# Patient Record
Sex: FEMALE | Race: WHITE | Employment: OTHER | URBAN - METROPOLITAN AREA
[De-identification: names, ages, dates, MRNs, and addresses within clinical notes are randomized per-mention and may not be internally consistent; named-entity substitution may affect disease eponyms.]

---

## 2017-04-05 ENCOUNTER — OFFICE VISIT (OUTPATIENT)
Dept: FAMILY MEDICINE CLINIC | Age: 32
End: 2017-04-05

## 2017-04-05 ENCOUNTER — HOSPITAL ENCOUNTER (OUTPATIENT)
Dept: LAB | Age: 32
Discharge: HOME | End: 2017-04-05
Payer: COMMERCIAL

## 2017-04-05 VITALS
BODY MASS INDEX: 19.84 KG/M2 | TEMPERATURE: 98.4 F | HEART RATE: 87 BPM | HEIGHT: 70 IN | RESPIRATION RATE: 16 BRPM | WEIGHT: 138.6 LBS | DIASTOLIC BLOOD PRESSURE: 82 MMHG | SYSTOLIC BLOOD PRESSURE: 130 MMHG | OXYGEN SATURATION: 99 %

## 2017-04-05 DIAGNOSIS — Z13.29 SCREENING FOR THYROID DISORDER: ICD-10-CM

## 2017-04-05 DIAGNOSIS — Z13.228 SCREENING FOR METABOLIC DISORDER: ICD-10-CM

## 2017-04-05 DIAGNOSIS — Z13.21 ENCOUNTER FOR VITAMIN DEFICIENCY SCREENING: ICD-10-CM

## 2017-04-05 DIAGNOSIS — Z13.220 SCREENING, LIPID: ICD-10-CM

## 2017-04-05 DIAGNOSIS — Z13.1 SCREENING FOR DIABETES MELLITUS: ICD-10-CM

## 2017-04-05 DIAGNOSIS — R41.840 ATTENTION AND CONCENTRATION DEFICIT: ICD-10-CM

## 2017-04-05 DIAGNOSIS — Z13.89 SCREENING FOR BLOOD OR PROTEIN IN URINE: ICD-10-CM

## 2017-04-05 DIAGNOSIS — Z13.0 SCREENING, ANEMIA, DEFICIENCY, IRON: ICD-10-CM

## 2017-04-05 DIAGNOSIS — Z00.00 ROUTINE GENERAL MEDICAL EXAMINATION AT A HEALTH CARE FACILITY: Primary | ICD-10-CM

## 2017-04-05 LAB
25(OH)D3 SERPL-MCNC: 30.7 NG/ML (ref 30–100)
ALBUMIN SERPL BCP-MCNC: 4.2 G/DL (ref 3.4–5)
ALBUMIN/GLOB SERPL: 1.4 {RATIO} (ref 0.8–1.7)
ALP SERPL-CCNC: 36 U/L (ref 45–117)
ALT SERPL-CCNC: 19 U/L (ref 13–56)
ANION GAP BLD CALC-SCNC: 9 MMOL/L (ref 3–18)
APPEARANCE UR: CLEAR
AST SERPL W P-5'-P-CCNC: 22 U/L (ref 15–37)
BASOPHILS # BLD AUTO: 0 K/UL (ref 0–0.06)
BASOPHILS # BLD: 0 % (ref 0–2)
BILIRUB SERPL-MCNC: 0.5 MG/DL (ref 0.2–1)
BILIRUB UR QL: NEGATIVE
BUN SERPL-MCNC: 10 MG/DL (ref 7–18)
BUN/CREAT SERPL: 13 (ref 12–20)
CALCIUM SERPL-MCNC: 8.7 MG/DL (ref 8.5–10.1)
CHLORIDE SERPL-SCNC: 107 MMOL/L (ref 100–108)
CHOLEST SERPL-MCNC: 191 MG/DL
CO2 SERPL-SCNC: 26 MMOL/L (ref 21–32)
COLOR UR: YELLOW
CREAT SERPL-MCNC: 0.8 MG/DL (ref 0.6–1.3)
DIFFERENTIAL METHOD BLD: NORMAL
EOSINOPHIL # BLD: 0.1 K/UL (ref 0–0.4)
EOSINOPHIL NFR BLD: 1 % (ref 0–5)
ERYTHROCYTE [DISTWIDTH] IN BLOOD BY AUTOMATED COUNT: 13.6 % (ref 11.6–14.5)
EST. AVERAGE GLUCOSE BLD GHB EST-MCNC: 108 MG/DL
GLOBULIN SER CALC-MCNC: 3.1 G/DL (ref 2–4)
GLUCOSE SERPL-MCNC: 76 MG/DL (ref 74–99)
GLUCOSE UR STRIP.AUTO-MCNC: NEGATIVE MG/DL
HBA1C MFR BLD: 5.4 % (ref 4.2–5.6)
HCT VFR BLD AUTO: 41.7 % (ref 35–45)
HDLC SERPL-MCNC: 80 MG/DL (ref 40–60)
HDLC SERPL: 2.4 {RATIO} (ref 0–5)
HGB BLD-MCNC: 13.6 G/DL (ref 12–16)
HGB UR QL STRIP: NEGATIVE
KETONES UR QL STRIP.AUTO: ABNORMAL MG/DL
LDLC SERPL CALC-MCNC: 101.2 MG/DL (ref 0–100)
LEUKOCYTE ESTERASE UR QL STRIP.AUTO: NEGATIVE
LIPID PROFILE,FLP: ABNORMAL
LYMPHOCYTES # BLD AUTO: 28 % (ref 21–52)
LYMPHOCYTES # BLD: 1.7 K/UL (ref 0.9–3.6)
MCH RBC QN AUTO: 29.8 PG (ref 24–34)
MCHC RBC AUTO-ENTMCNC: 32.6 G/DL (ref 31–37)
MCV RBC AUTO: 91.4 FL (ref 74–97)
MONOCYTES # BLD: 0.3 K/UL (ref 0.05–1.2)
MONOCYTES NFR BLD AUTO: 4 % (ref 3–10)
NEUTS SEG # BLD: 3.9 K/UL (ref 1.8–8)
NEUTS SEG NFR BLD AUTO: 67 % (ref 40–73)
NITRITE UR QL STRIP.AUTO: NEGATIVE
PH UR STRIP: 5.5 [PH] (ref 5–8)
PLATELET # BLD AUTO: 220 K/UL (ref 135–420)
PMV BLD AUTO: 10.7 FL (ref 9.2–11.8)
POTASSIUM SERPL-SCNC: 4 MMOL/L (ref 3.5–5.5)
PROT SERPL-MCNC: 7.3 G/DL (ref 6.4–8.2)
PROT UR STRIP-MCNC: NEGATIVE MG/DL
RBC # BLD AUTO: 4.56 M/UL (ref 4.2–5.3)
SODIUM SERPL-SCNC: 142 MMOL/L (ref 136–145)
SP GR UR REFRACTOMETRY: 1.01 (ref 1–1.03)
TRIGL SERPL-MCNC: 49 MG/DL (ref ?–150)
TSH SERPL DL<=0.05 MIU/L-ACNC: 0.61 UIU/ML (ref 0.36–3.74)
UROBILINOGEN UR QL STRIP.AUTO: 0.2 EU/DL (ref 0.2–1)
VLDLC SERPL CALC-MCNC: 9.8 MG/DL
WBC # BLD AUTO: 5.9 K/UL (ref 4.6–13.2)

## 2017-04-05 PROCEDURE — 80053 COMPREHEN METABOLIC PANEL: CPT | Performed by: FAMILY MEDICINE

## 2017-04-05 PROCEDURE — 82306 VITAMIN D 25 HYDROXY: CPT | Performed by: FAMILY MEDICINE

## 2017-04-05 PROCEDURE — 83036 HEMOGLOBIN GLYCOSYLATED A1C: CPT | Performed by: FAMILY MEDICINE

## 2017-04-05 PROCEDURE — 80061 LIPID PANEL: CPT | Performed by: FAMILY MEDICINE

## 2017-04-05 PROCEDURE — 36415 COLL VENOUS BLD VENIPUNCTURE: CPT | Performed by: FAMILY MEDICINE

## 2017-04-05 PROCEDURE — 85025 COMPLETE CBC W/AUTO DIFF WBC: CPT | Performed by: FAMILY MEDICINE

## 2017-04-05 PROCEDURE — 84443 ASSAY THYROID STIM HORMONE: CPT | Performed by: FAMILY MEDICINE

## 2017-04-05 PROCEDURE — 81003 URINALYSIS AUTO W/O SCOPE: CPT | Performed by: FAMILY MEDICINE

## 2017-04-05 NOTE — PROGRESS NOTES
HISTORY OF PRESENT ILLNESS  Carlos Celeste is a 28 y.o. female. Establish Care   The history is provided by the patient. Pertinent negatives include no chest pain, no abdominal pain, no headaches and no shortness of breath. History reviewed. No pertinent past medical history. History reviewed. No pertinent surgical history. Family History   Problem Relation Age of Onset    Diabetes Brother      type 1    Cancer Neg Hx     Heart Disease Neg Hx     Hypertension Neg Hx      History   Smoking Status    Never Smoker   Smokeless Tobacco    Not on file     History   Alcohol Use    Yes     Comment: very rare      Health Maintenance Review:  Tetanus immunization - Tdap 2016  Influenza immunization - N/A  Pap smear - 2/2016, norrmal  Mammogram - N/A    Establish Care/Wellness Encounter Plan:  Anticipatory guidance and recommendations provided verbally and with printed information. Return for routine follow up in 1 year, sooner with any problems. 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 Indications: PAIN, Disp: 60 Tab, Rfl: 1      Review of Systems   Constitutional: Negative for chills, fever and weight loss. HENT: Negative for hearing loss. Eyes: Negative for blurred vision and double vision. Wears corrective lenses   Respiratory: Negative for cough, shortness of breath and wheezing. Cardiovascular: Negative for chest pain, palpitations and leg swelling. Gastrointestinal: Negative for abdominal pain, constipation, diarrhea, heartburn, nausea and vomiting. Genitourinary: Negative for dysuria and urgency. Musculoskeletal: Negative for joint pain and myalgias. Skin: Negative for itching and rash.    Neurological: Negative for dizziness, tingling, sensory change, focal weakness and headaches. Endo/Heme/Allergies: Negative for environmental allergies. Psychiatric/Behavioral: Negative for depression. The patient is not nervous/anxious and does not have insomnia. 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 She appears well-developed and well-nourished. HENT:   Head: Normocephalic. Right Ear: Tympanic membrane and ear canal normal.   Left Ear: Tympanic membrane and ear canal normal.   Mouth/Throat: Oropharynx is clear and moist.   Eyes: Conjunctivae and EOM are normal. Pupils are equal, round, and reactive to light. Neck: Neck supple. Cardiovascular: Normal rate, regular rhythm, normal heart sounds and intact distal pulses. Pulmonary/Chest: Effort normal and breath sounds normal.   Abdominal: Soft. Bowel sounds are normal. She exhibits no mass. There is no tenderness. Musculoskeletal: She exhibits no edema. Neurological: She is alert and oriented to person, place, and time. She has normal reflexes. Rhomberg negative, no focal neurological deficits noted     Skin: Skin is warm and dry. Psychiatric: She has a normal mood and affect. Her behavior is normal.   Nursing note and vitals reviewed. ASSESSMENT and PLAN    ICD-10-CM ICD-9-CM    1. Routine general medical examination at a health care facility Z00.00 V70.0    2. Attention and concentration deficit R41.840 799.51    3. Screening, anemia, deficiency, iron Z13.0 V78.0 CBC WITH AUTOMATED DIFF   4. Screening for diabetes mellitus Z13.1 V77.1 HEMOGLOBIN A1C WITH EAG   5.  Screening, lipid Z13.220 V77.91 LIPID PANEL   6. Screening for metabolic disorder J56.305 Z98.27 METABOLIC PANEL, COMPREHENSIVE   7. Screening for blood or protein in urine Z13.89 V82.9 URINALYSIS W/ RFLX MICROSCOPIC   8. Screening for thyroid disorder Z13.29 V77.0 TSH 3RD GENERATION   9. Encounter for vitamin deficiency screening Z13.21 V77.99 VITAMIN D, 25 HYDROXY   Further disposition pending lab results if indicated.   Referral for ADD testing-Patient will check with insurance carrier for on plan providers and call back (attempts to contact potential providers during the appointment yielded no response)

## 2017-04-05 NOTE — PATIENT INSTRUCTIONS
Further disposition pending lab results if indicated. Referral for ADD testing  Anticipatory guidance and recommendations provided verbally and with printed information. Return for routine follow up in 1 year, sooner with any problems. Well Visit, Ages 25 to 48: Care Instructions  Your Care Instructions  Physical exams can help you stay healthy. Your doctor has checked your overall health and may have suggested ways to take good care of yourself. He or she also may have recommended tests. At home, you can help prevent illness with healthy eating, regular exercise, and other steps. Follow-up care is a key part of your treatment and safety. Be sure to make and go to all appointments, and call your doctor if you are having problems. It's also a good idea to know your test results and keep a list of the medicines you take. How can you care for yourself at home? · Reach and stay at a healthy weight. This will lower your risk for many problems, such as obesity, diabetes, heart disease, and high blood pressure. · Get at least 30 minutes of physical activity on most days of the week. Walking is a good choice. You also may want to do other activities, such as running, swimming, cycling, or playing tennis or team sports. Discuss any changes in your exercise program with your doctor. · Do not smoke or allow others to smoke around you. If you need help quitting, talk to your doctor about stop-smoking programs and medicines. These can increase your chances of quitting for good. · Talk to your doctor about whether you have any risk factors for sexually transmitted infections (STIs). Having one sex partner (who does not have STIs and does not have sex with anyone else) is a good way to avoid these infections. · Use birth control if you do not want to have children at this time. Talk with your doctor about the choices available and what might be best for you. · Protect your skin from too much sun.  When you're outdoors from 10 a.m. to 4 p.m., stay in the shade or cover up with clothing and a hat with a wide brim. Wear sunglasses that block UV rays. Even when it's cloudy, put broad-spectrum sunscreen (SPF 30 or higher) on any exposed skin. · See a dentist one or two times a year for checkups and to have your teeth cleaned. · Wear a seat belt in the car. · Drink alcohol in moderation, if at all. That means no more than 2 drinks a day for men and 1 drink a day for women. Follow your doctor's advice about when to have certain tests. These tests can spot problems early. For everyone  · Cholesterol. Have the fat (cholesterol) in your blood tested after age 21. Your doctor will tell you how often to have this done based on your age, family history, or other things that can increase your risk for heart disease. · Blood pressure. Have your blood pressure checked during a routine doctor visit. Your doctor will tell you how often to check your blood pressure based on your age, your blood pressure results, and other factors. · Vision. Talk with your doctor about how often to have a glaucoma test.  · Diabetes. Ask your doctor whether you should have tests for diabetes. · Colon cancer. Have a test for colon cancer at age 48. You may have one of several tests. If you are younger than 48, you may need a test earlier if you have any risk factors. Risk factors include whether you already had a precancerous polyp removed from your colon or whether your parent, brother, sister, or child has had colon cancer. For women  · Breast exam and mammogram. Talk to your doctor about when you should have a clinical breast exam and a mammogram. Medical experts differ on whether and how often women under 50 should have these tests. Your doctor can help you decide what is right for you. · Pap test and pelvic exam. Begin Pap tests at age 24. A Pap test is the best way to find cervical cancer.  The test often is part of a pelvic exam. Ask how often to have this test.  · Tests for sexually transmitted infections (STIs). Ask whether you should have tests for STIs. You may be at risk if you have sex with more than one person, especially if your partners do not wear condoms. For men  · Tests for sexually transmitted infections (STIs). Ask whether you should have tests for STIs. You may be at risk if you have sex with more than one person, especially if you do not wear a condom. · Testicular cancer exam. Ask your doctor whether you should check your testicles regularly. · Prostate exam. Talk to your doctor about whether you should have a blood test (called a PSA test) for prostate cancer. Experts differ on whether and when men should have this test. Some experts suggest it if you are older than 39 and are -American or have a father or brother who got prostate cancer when he was younger than 72. When should you call for help? Watch closely for changes in your health, and be sure to contact your doctor if you have any problems or symptoms that concern you. Where can you learn more? Go to http://hyacinth-elena.info/. Enter P072 in the search box to learn more about \"Well Visit, Ages 25 to 48: Care Instructions. \"  Current as of: July 19, 2016  Content Version: 11.2  © 9540-9728 [employer]. Care instructions adapted under license by [employer] (which disclaims liability or warranty for this information). If you have questions about a medical condition or this instruction, always ask your healthcare professional. [place] [address] any warranty or liability for your use of this information.

## 2017-04-05 NOTE — PROGRESS NOTES
Kenisha Sommer is a 28 y.o. female here to establish care        1. Have you been to the ER, urgent care clinic or hospitalized since your last visit? NO.     2. Have you seen or consulted any other health care providers outside of the [address] [place] [address] since your last visit (Include any pap smears or colon screening)? NO      Do you have an Advanced Directive? NO    Would you like information on Advanced Directives?  NO      Learning Assessment 4/5/2017   PRIMARY LEARNER Patient   HIGHEST LEVEL OF EDUCATION - PRIMARY LEARNER  4 YEARS OF COLLEGE   BARRIERS PRIMARY LEARNER NONE   CO-LEARNER CAREGIVER No   PRIMARY LANGUAGE ENGLISH   LEARNER PREFERENCE PRIMARY DEMONSTRATION   ANSWERED BY patient   RELATIONSHIP SELF

## 2017-04-05 NOTE — MR AVS SNAPSHOT
Visit Information Date & Time Provider Department Dept. Phone Encounter #  
 4/5/2017  1:30 PM Harley Quintero, [facility] [phone] [id number] Upcoming Health Maintenance Date Due INFLUENZA AGE 9 TO ADULT 8/1/2016 PAP AKA CERVICAL CYTOLOGY 2/17/2017 DTaP/Tdap/Td series (2 - Td) 11/3/2025 Allergies as of 4/5/2017  Review Complete On: 4/5/2017 By: Harley Quintero MD  
 No Known Allergies Current Immunizations  Reviewed on 1/5/2016 Name Date Influenza Vaccine 10/13/2015 MMR 1/5/2016  8:00 AM  
 Tdap 11/3/2015 Not reviewed this visit You Were Diagnosed With   
  
 Codes Comments Routine general medical examination at a health care facility    -  Primary ICD-10-CM: Z00.00 ICD-9-CM: V70.0 Attention and concentration deficit     ICD-10-CM: R41.840 ICD-9-CM: 799.51 Screening, anemia, deficiency, iron     ICD-10-CM: Z13.0 ICD-9-CM: V78.0 Screening for diabetes mellitus     ICD-10-CM: Z13.1 ICD-9-CM: V77.1 Screening, lipid     ICD-10-CM: Z22.276 ICD-9-CM: V77.91 Screening for metabolic disorder     DKC-73-DM: Z13.228 ICD-9-CM: V77.99 Screening for blood or protein in urine     ICD-10-CM: Z13.89 ICD-9-CM: V82.9 Screening for thyroid disorder     ICD-10-CM: Z13.29 ICD-9-CM: V77.0 Encounter for vitamin deficiency screening     ICD-10-CM: Z13.21 ICD-9-CM: V77.99 Vitals BP Pulse Temp Resp Height(growth percentile) Weight(growth percentile) 130/82 (BP 1 Location: Left arm, BP Patient Position: Sitting) 87 98.4 °F (36.9 °C) (Oral) 16 5' 10\" (1.778 m) 138 lb 9.6 oz (62.9 kg) SpO2 BMI OB Status Smoking Status 99% 19.89 kg/m2 Having regular periods Never Smoker BMI and BSA Data Body Mass Index Body Surface Area  
 19.89 kg/m 2 1.76 m 2 Your Updated Medication List  
  
   
This list is accurate as of: 4/5/17  2:05 PM.  Always use your most recent med list.  
  
  
  
 ibuprofen 800 mg tablet Commonly known as:  MOTRIN Take 1 Tab by mouth every eight (8) hours as needed. Indications: PAIN To-Do List   
 04/05/2017 Lab:  CBC WITH AUTOMATED DIFF   
  
 04/05/2017 Lab:  HEMOGLOBIN A1C WITH EAG   
  
 04/05/2017 Lab:  LIPID PANEL   
  
 04/05/2017 Lab:  METABOLIC PANEL, COMPREHENSIVE   
  
 04/05/2017 Lab:  TSH 3RD GENERATION   
  
 04/05/2017 Lab:  URINALYSIS W/ RFLX MICROSCOPIC   
  
 04/05/2017 Lab:  VITAMIN D, 25 HYDROXY Patient Instructions Further disposition pending lab results if indicated. Referral for ADD testing Anticipatory guidance and recommendations provided verbally and with printed information. Return for routine follow up in 1 year, sooner with any problems. Well Visit, Ages 25 to 48: Care Instructions Your Care Instructions Physical exams can help you stay healthy. Your doctor has checked your overall health and may have suggested ways to take good care of yourself. He or she also may have recommended tests. At home, you can help prevent illness with healthy eating, regular exercise, and other steps. Follow-up care is a key part of your treatment and safety. Be sure to make and go to all appointments, and call your doctor if you are having problems. It's also a good idea to know your test results and keep a list of the medicines you take. How can you care for yourself at home? · Reach and stay at a healthy weight. This will lower your risk for many problems, such as obesity, diabetes, heart disease, and high blood pressure. · Get at least 30 minutes of physical activity on most days of the week. Walking is a good choice. You also may want to do other activities, such as running, swimming, cycling, or playing tennis or team sports. Discuss any changes in your exercise program with your doctor. · Do not smoke or allow others to smoke around you.  If you need help quitting, talk to your doctor about stop-smoking programs and medicines. These can increase your chances of quitting for good. · Talk to your doctor about whether you have any risk factors for sexually transmitted infections (STIs). Having one sex partner (who does not have STIs and does not have sex with anyone else) is a good way to avoid these infections. · Use birth control if you do not want to have children at this time. Talk with your doctor about the choices available and what might be best for you. · Protect your skin from too much sun. When you're outdoors from 10 a.m. to 4 p.m., stay in the shade or cover up with clothing and a hat with a wide brim. Wear sunglasses that block UV rays. Even when it's cloudy, put broad-spectrum sunscreen (SPF 30 or higher) on any exposed skin. · See a dentist one or two times a year for checkups and to have your teeth cleaned. · Wear a seat belt in the car. · Drink alcohol in moderation, if at all. That means no more than 2 drinks a day for men and 1 drink a day for women. Follow your doctor's advice about when to have certain tests. These tests can spot problems early. For everyone · Cholesterol. Have the fat (cholesterol) in your blood tested after age 21. Your doctor will tell you how often to have this done based on your age, family history, or other things that can increase your risk for heart disease. · Blood pressure. Have your blood pressure checked during a routine doctor visit. Your doctor will tell you how often to check your blood pressure based on your age, your blood pressure results, and other factors. · Vision. Talk with your doctor about how often to have a glaucoma test. 
· Diabetes. Ask your doctor whether you should have tests for diabetes. · Colon cancer. Have a test for colon cancer at age 48. You may have one of several tests.  If you are younger than 48, you may need a test earlier if you have any risk factors. Risk factors include whether you already had a precancerous polyp removed from your colon or whether your parent, brother, sister, or child has had colon cancer. For women · Breast exam and mammogram. Talk to your doctor about when you should have a clinical breast exam and a mammogram. Medical experts differ on whether and how often women under 50 should have these tests. Your doctor can help you decide what is right for you. · Pap test and pelvic exam. Begin Pap tests at age 24. A Pap test is the best way to find cervical cancer. The test often is part of a pelvic exam. Ask how often to have this test. 
· Tests for sexually transmitted infections (STIs). Ask whether you should have tests for STIs. You may be at risk if you have sex with more than one person, especially if your partners do not wear condoms. For men · Tests for sexually transmitted infections (STIs). Ask whether you should have tests for STIs. You may be at risk if you have sex with more than one person, especially if you do not wear a condom. · Testicular cancer exam. Ask your doctor whether you should check your testicles regularly. · Prostate exam. Talk to your doctor about whether you should have a blood test (called a PSA test) for prostate cancer. Experts differ on whether and when men should have this test. Some experts suggest it if you are older than 39 and are -American or have a father or brother who got prostate cancer when he was younger than 72. When should you call for help? Watch closely for changes in your health, and be sure to contact your doctor if you have any problems or symptoms that concern you. Where can you learn more? Go to http://hyacinth-elena.info/. Enter P072 in the search box to learn more about \"Well Visit, Ages 25 to 48: Care Instructions. \" Current as of: July 19, 2016 Content Version: 11.2 © 0531-7935 [employer]. Care instructions adapted under license by [employer] (which disclaims liability or warranty for this information). If you have questions about a medical condition or this instruction, always ask your healthcare professional. [address] any warranty or liability for your use of this information. Introducing [facility]! [facility] introduces obopay patient portal. Now you can access parts of your medical record, email your doctor's office, and request medication refills online. 1. In your internet browser, go to [email or web address] ([place]). [email or web address] ([place]) 2. Click on the First Time User? Click Here link in the Sign In box. You will see the New Member Sign Up page. 3. Enter your obopay Access Code exactly as it appears below. You will not need to use this code after youve completed the sign-up process. If you do not sign up before the expiration date, you must request a new code. · obopay Access Code: [email or web address] Expires: 7/4/2017  1:17 PM 
 
4. Enter the last four digits of your Social Security Number (xxxx) and Date of Birth (mm/dd/yyyy) as indicated and click Submit. You will be taken to the next sign-up page. 5. Create a obopay ID. This will be your obopay login ID and cannot be changed, so think of one that is secure and easy to remember. 6. Create a obopay password. You can change your password at any time. 7. Enter your Password Reset Question and Answer. This can be used at a later time if you forget your password. 8. Enter your e-mail address. You will receive e-mail notification when new information is available in [address] 9. Click Sign Up. You can now view and download portions of your medical record. 10. Click the Download Summary menu link to download a portable copy of your medical information.  
 
If you have questions, please visit the Frequently Asked Questions section of the [employer]. Remember, gdgthart is NOT to be used for urgent needs. For medical emergencies, dial 911. Now available from your iPhone and Android! Please provide this summary of care documentation to your next provider. Your primary care clinician is listed as Jennifer Lal. If you have any questions after today's visit, please call [phone].

## 2017-11-27 ENCOUNTER — HOSPITAL ENCOUNTER (OUTPATIENT)
Dept: PHYSICAL THERAPY | Age: 32
Discharge: HOME | End: 2017-11-27
Payer: COMMERCIAL

## 2017-11-27 PROCEDURE — 97161 PT EVAL LOW COMPLEX 20 MIN: CPT

## 2017-11-27 PROCEDURE — 97110 THERAPEUTIC EXERCISES: CPT

## 2017-11-27 NOTE — PROGRESS NOTES
[address] [facility] [facility] AT [address], [address] [place] [address] Road  Phone: [phone]   [phone]  PLAN OF CARE / [address] [place] [address] PHYSICAL THERAPY SERVICES  Patient Name: Cally Osuna : 1985   Medical   Diagnosis: L knee Pain Treatment Diagnosis: L Knee Pain   Onset Date: 17     Referral Source: Nickolas Wilson MD Start of Care [facility] [place]): 2017   Prior Hospitalization: See medical history Provider #: [id number]   Prior Level of Function: I with ADL's   Comorbidities: None Listed   Medications: Verified on Patient Summary List   The Plan of Care and following information is based on the information from the initial evaluation.   ===========================================================================================  Assessment / key information:  Pt is 28 y.o. female presenting with pain in the L knee. Pain ranges 4-7/10; better with stretching, worse with sitting and not moving. Pt has pain in the L knee as well as the R hip starting  after a 10 mile run. Patient reports increased pain since beginning of November, and located at the anterior portion of the L knee cap. Pt is very active and denies any pain with jumping or squatting. Pt reports main functional limitation at this time is running. Pt reports L hip pain in the anterior portion of the hip flexor that has been present for several months. Pt amb with normalized gait pattern BL. Mild pronation of the BL feet and mild tibial valgus noted BL. Leg length and pelvic alignment symmetrical. AROM of the BL hips and knees WNL in all directions. Decrease in BL hip and knee strength 4-/5 in all directions. Moderate tightness noted in the BL HS, quads, hip flexors, and ITB. TTP over the superior aspect of the L patella, with mild TTP over the medial and lateral joint lines. Moderate crepitus with squats and pt needs work on squat biomechanics/posturing.  Sensation intact to light touch in the BL LE's. The patient was instructed in a home exercise program to address the above findings/deficits. Pt will benefit from PT interventions to address the aforementioned deficits and allow pt to return to PLOF.    ===========================================================================================  History LOW Complexity : Zero comorbidities / personal factors that will impact the outcome / POC; Examination HIGH Complexity : 4+ Standardized tests and measures addressing body structure, function, activity limitation and / or participation in recreation ; Presentation MEDIUM Complexity : Evolving with changing characteristics ; Decision Making LOW Complexity : FOTO score of ; Complexity LOW   Problem List: pain affecting function, decrease strength, impaired gait/ balance, decrease ADL/ functional abilitiies, decrease activity tolerance, decrease flexibility/ joint mobility and decrease transfer abilities   Treatment Plan may include any combination of the following: Therapeutic exercise, Therapeutic activities, Neuromuscular re-education, Physical agent/modality, Gait/balance training, Manual therapy, Patient education, Functional mobility training and Stair training  Patient / Family readiness to learn indicated by: asking questions, trying to perform skills and interest  Persons(s) to be included in education: patient (P)  Barriers to Learning/Limitations: None  Measures taken:    Patient Goal (s): Strengthen knee/hip, no pain with running   Patient self reported health status: excellent  Rehabilitation Potential: good   Short Term Goals: To be accomplished in  4  treatments:  1. Independent/compliant with long term HEP for flexibility/mobility  2. Evaluate patient for video running analysis with appropriate recommendations PRN  3. Pt will demonstrate BL Jean test [facility] to demonstrate improved rectus femoris mobility at push-off     Long Term Goals:  To be accomplished in  8  treatments:  1. Improve FOTO outcome score by 10% to indicate a significant improvement in ADL function- will assess at DC  2. Pt will report 75% improvement with running and other higher impact recreational activities  3. Pt will increase gross strength > or = to 4+/5 in order to improve ability to perform functional ADL's.  4. Pt will demonstrate independence with long term HEP in order to prepare for DC. Frequency / Duration:   Patient to be seen  2  times per week for 8  treatments:  Patient / Caregiver education and instruction: exercises  G-Codes (GP): ORALIA  Therapist Signature: Maryjo Paiz PT Date: 10/49/6139   Certification Period: NA Time: 11:06 AM   ===========================================================================================  I certify that the above Physical Therapy Services are being furnished while the patient is under my care. I agree with the treatment plan and certify that this therapy is necessary. Physician Signature:        Date:       Time:     Please sign and return to [facility] or you may fax the signed copy to [phone]. Thank you.

## 2017-11-27 NOTE — PROGRESS NOTES
PT KNEE EVAL AND TREATMENT    Patient Name: Abiel Espinoza  Date:2017  : 1985  [x]  Patient  Verified  Payor: BLUE CROSS / Plan: [address] [place] [address] / Product Type: PPO /    In time:1100  Out time:1150  Total Treatment Time (min): 50  Visit #: 1 of 12    Treatment Area: L knee Pain  SUBJECTIVE  Pain Level (0-10 scale): (C):  (B):  (W):    Any medication changes, allergies to medications, diagnosis change, or new procedure performed: see summary sheet for update  Subjective functional status/changes:  CHIEF COMPLAINT: Pt is 28 y.o. female presenting with pain in the L knee. Pain ranges 4-7/10; better with stretching, worse with sitting and not moving. Pt has pain in the L knee as well as the R hip starting  after a 10 mile run. Patient reports increased pain since beginning of November, and located at the anterior portion of the L knee cap. Pt is very active and denies any pain with jumping or squatting. Pt reports main functional limitation at this time is running. Pt reports L hip pain in the anterior portion of the hip flexor that has been present for several months.      OBJECTIVE  Posture: [] Varus    [] Valgus    [] Recurvatum        [] Tibial Torsion    [] Foot Supination    [] Foot Pronation    Describe:    Gait:  [x] Normal    [] Abnormal    [] Antalgic    [] NWB    Device:    Describe: mild pronation of the BL arches, mild tibial valgus    ROM / Strength  [] Unable to assess                  AROM   (WNL)            PROM          Strength (1-5)    Left Right Left Right Left Right   Hip Flexion     4- 4-    Extension     4- 4-    Abduction     4- 4-    Adduction     4- 4-   Knee Flexion     4- 4-    Extension     4- 4-   Ankle Plantarflexion          Dorsiflexion           Flexibility: [] Unable to assess at this time  Hamstrings:    (L) Tightness= [] WNL   [] Min   [x] Mod   [] Severe    (R) Tightness= [] WNL   [] Min   [x] Mod   [] Severe  Quadriceps:    (L) Tightness= [] WNL   [] Min   [x] Mod   [] Severe    (R) Tightness= [] WNL   [] Min   [x] Mod   [] Severe  Gastroc:      (L) Tightness= [] WNL   [] Min   [x] Mod   [] Severe    (R) Tightness= [] WNL   [] Min   [x] Mod   [] Severe    Palpation:TTP superior knee cap, medial and lateral L joint line, BL hip flexors    Optional Tests:  Patellar Positioning (Static)   []L []R Normal []L []R Lateral   []L []R [place]      []L []R Medial   []L []R [place]    Patellar Tracking   [x]L []R Glide (Lat)   []L []R Tilt (Lat)     []L []R Glide (Med)  []L []R Tilt (Med)      []L []R Tile (Inf)     Patellar Mobility   []L []R Hypermobile [x]L [x]R Hypomobile         Other tests/comments: no ligament laxity noted    Modality (rationale): NA  []  E-Stim: type _ x _ min     []att   []unatt   []w/US   []w/ice   []w/heat  []  Traction: []cerv   []pelvic   _ lbs x _ min     []pro   []sup   []int   []const  []  Ultrasound: []cont   []pulse    _ W/cm2 x _  min   []1MHz   []3MHz  []  Iontophoresis: []take home patch w/ dexamethazone    []40mA   []80mA                               []_ mA min w/: []dexamethazone   []other:_  []  Ice pack _  min     [] Hot pack _  min     [] Paraffin _  min  []  Other:     Patient Education: [x] Established HEP    [] POT (minutes) :15    Pain Level (0-10 scale) post treatment: 4  ASSESSMENT  [x]  See Plan of Care    Evaluation Code Complexity: History LOW Complexity : Zero comorbidities / personal factors that will impact the outcome / POC; Examination HIGH Complexity : 4+ Standardized tests and measures addressing body structure, function, activity limitation and / or participation in recreation ; Presentation MEDIUM Complexity : Evolving with changing characteristics ;  Decision Making LOW Complexity : FOTO score of ; Complexity LOW     Justification for Eval Code Complexity:  Patient History : None  Examination SEE ABOVE EXAM  Clinical Presentation: evolving pain in the L knee  Clinical Decision Making : FOTO 75      PLAN  [x]  Upgrade activities as tolerated     []  Continue plan of care  []  Discharge due to:_  [x] Other:_ POC  2x/week for 4 weeks    Meche Paiz, PT 11/27/2017

## 2017-11-28 ENCOUNTER — TELEPHONE (OUTPATIENT)
Dept: FAMILY MEDICINE CLINIC | Age: 32
End: 2017-11-28

## 2017-11-28 NOTE — TELEPHONE ENCOUNTER
Pt's plan of care from [facility] for 11/27/17  was signed by Dr. Joy Faith and faxed back to [phone].  Confirmation received

## 2017-11-29 ENCOUNTER — HOSPITAL ENCOUNTER (OUTPATIENT)
Dept: PHYSICAL THERAPY | Age: 32
Discharge: HOME | End: 2017-11-29
Payer: COMMERCIAL

## 2017-11-29 PROCEDURE — 97110 THERAPEUTIC EXERCISES: CPT

## 2017-11-29 NOTE — PROGRESS NOTES
PT DAILY TREATMENT NOTE 8    Patient Name: John Reeves  Date:2017  : 1985  [x]  Patient  Verified  Payor: AARON [place] / Plan: [address] [place] [address] / Product Type: PPO /    In time:405  Out time:518  Total Treatment Time (min): 68   Visit #: 2 of 12    Treatment Area: Left knee pain [M25.562]    SUBJECTIVE  Pain Level (0-10 scale): 0  Any medication changes, allergies to medications, adverse drug reactions, diagnosis change, or new procedure performed?: [x] No    [] Yes (see summary sheet for update)  Subjective functional status/changes:   [] No changes reported  \"I have been doing the exercises and I think they are making me feel looser\"    OBJECTIVE  Modality rationale: NI   Min Type Additional Details    [] Estim: []Att   []Unatt        []TENS instruct                  []IFC  []Premod   []NMES                     []Other:  []w/US   []w/ice   []w/heat  Position:  Location:    []  Traction: [] Cervical       []Lumbar                       [] Prone          []Supine                       []Intermittent   []Continuous Lbs:  [] before manual  [] after manual    []  Ultrasound: []Continuous   [] Pulsed                           []1MHz   []3MHz Location:  W/cm2:    []  Iontophoresis with dexamethasone         Location: [] Take home patch   [] In clinic    []  Ice     []  heat  []  Ice massage Position:  Location:    []  Vasopneumatic Device Pressure:       [] lo [] med [] hi   Temperature: [] lo [] med [] hi   [x] Skin assessment post-treatment:  [x]intact []redness- no adverse reaction       []redness  adverse reaction:       73 min Therapeutic Exercise:  [x] See flow sheet :   Rationale: increase ROM and increase strength to improve the patients ability to perform functional ADL's    X min Patient Education: [x] Review HEP    [] Progressed/Changed HEP based on:   [] positioning   [] body mechanics   [] transfers   [] heat/ice application        Other Objective/Functional Measures: Initiated therex today per flow sheet, requiring vc and demo 100% of the time for proper form and technique. Pain Level (0-10 scale) post treatment: 0    ASSESSMENT/Changes in Function: Pt continues to need work on BL hip and knee strength, as well as continued work on [address] flexibility with further therapy. Pt will be running a 1/2 marathon this weekend and will resume therapy upon returning next week. Patient will continue to benefit from skilled PT services to modify and progress therapeutic interventions, address functional mobility deficits, address ROM deficits, address strength deficits, analyze and address soft tissue restrictions, analyze and cue movement patterns, analyze and modify body mechanics/ergonomics and assess and modify postural abnormalities to attain remaining goals. [x]  See Plan of Care  []  See progress note/recertification  []  See Discharge Summary         Progress towards goals / Updated goals:  All goals reviewed and progressing appropriately as of 11/29/2017     PLAN  [x]  Upgrade activities as tolerated     [x]  Continue plan of care  []  Update interventions per flow sheet       []  Discharge due to:_  []  Other:_      Agapito Paiz, SAM 11/29/2017  12:54 PM    Future Appointments  Date Time Provider Susy Vargas   11/29/2017 4:00 PM Agapito Paiz, PT [facility]   12/6/2017 1:00 PM Deejay PLUNKETT [facility]   12/8/2017 3:00 PM Deejay PLUNKETT [facility]

## 2017-12-06 ENCOUNTER — HOSPITAL ENCOUNTER (OUTPATIENT)
Dept: PHYSICAL THERAPY | Age: 32
Discharge: HOME | End: 2017-12-06
Payer: COMMERCIAL

## 2017-12-06 PROCEDURE — 97140 MANUAL THERAPY 1/> REGIONS: CPT

## 2017-12-06 PROCEDURE — 97110 THERAPEUTIC EXERCISES: CPT

## 2017-12-06 NOTE — PROGRESS NOTES
PT DAILY TREATMENT NOTE 8    Patient Name: Rosetta Freire  Date:2017  : 1985  [x]  Patient  Verified  Payor: BLUE CROSS / Plan: [address] [place] [address] / Product Type: PPO /    In time:1:06  Out time:2:02  Total Treatment Time (min): 56  Total Timed Codes (min): 56  1:1 Treatment Time (min):    Visit #: 3 of 12    Treatment Area: Left knee pain [M25.562]    SUBJECTIVE  Pain Level (0-10 scale): 0  Any medication changes, allergies to medications, adverse drug reactions, diagnosis change, or new procedure performed?: [x] No    [] Yes (see summary sheet for update)  Subjective functional status/changes:   [] No changes reported  I had a half marathon over the weekend. Originally I had some pain in my left lateral thigh/knee and under the knee cap, but it seems to be getting better. OBJECTIVE    48 min Therapeutic Exercise:  [] See flow sheet :   Rationale: increase strength and flexibility to improve the patients ability to perform gait and other dynamic movement activities    8 min Manual Therapy:  Instrument assisted soft tissue mobilization applied to L distal quad tendon/ITB using fanning and strumming techniques     Rationale: increase tissue extensibility to improve the patient's ability to change and maintain head and shoulder positions    Pain Level (0-10 scale) post treatment: 0    ASSESSMENT/Changes in Function: Patient presents with increased left iliopsoas tightness, and increased flexibility/soft tissue restriction of the rectus femoris and quad tendon. Patient will continue to benefit from skilled PT services to modify and progress therapeutic interventions, address strength deficits, analyze and address soft tissue restrictions and analyze and cue movement patterns to attain remaining goals.      []  See Plan of Care  []  See progress note/recertification  []  See Discharge Summary           PLAN  []  Upgrade activities as tolerated     [x]  Continue plan of care  [] Update interventions per flow sheet       []  Discharge due to:_  []  Other:_      Geoff Kaufman, PT 12/6/2017  12:59 PM

## 2017-12-08 ENCOUNTER — HOSPITAL ENCOUNTER (OUTPATIENT)
Dept: PHYSICAL THERAPY | Age: 32
Discharge: HOME | End: 2017-12-08
Payer: COMMERCIAL

## 2017-12-08 PROCEDURE — 97110 THERAPEUTIC EXERCISES: CPT

## 2017-12-08 PROCEDURE — 97140 MANUAL THERAPY 1/> REGIONS: CPT

## 2017-12-08 NOTE — PROGRESS NOTES
PT DAILY TREATMENT NOTE     Patient Name: Rosetta Freire  Date:2017  : 1985  [x]  Patient  Verified  Payor: AARON [place] / Plan: [address] [place] [address] / Product Type: PPO /    In time:3:08  Out time:4:29  Total Treatment Time (min): 81  Total Timed Codes (min): 75  1:1 Treatment Time (min):    Visit #: 4 of 12    Treatment Area: Left knee pain [M25.562]    SUBJECTIVE  Pain Level (0-10 scale): 2/10  Any medication changes, allergies to medications, adverse drug reactions, diagnosis change, or new procedure performed?: [x] No    [] Yes (see summary sheet for update)  Subjective functional status/changes:   [] No changes reported  My hip still feels exactly the same, but my knee just feels like it needs to pop all of the time. I did work out at the house yesterday and I felt ok except for my IT band. OBJECTIVE    71 min Therapeutic Exercise:  [x] See flow sheet :   Rationale: increase ROM, increase strength, improve balance and increase proprioception to improve the patients ability to improve functional abilities    8 min Manual Therapy:  Instrument assisted soft tissue mobilization applied to L distal 1/2 of ITB using GT 1&3   Rationale: increase tissue extensibility to improve the patient's ability to improve functional mobility         min Patient Education: [x] Review HEP    [] Progressed/Changed HEP based on:   [] positioning   [] body mechanics   [] transfers   [] heat/ice application        Other Objective/Functional Measures:     Pain Level (0-10 scale) post treatment: 0    ASSESSMENT/Changes in Function: Pt was able to advance to single leg bridges, single leg [employer] squats/heel raises and addition of 2 lbs with SLR's with min to mod challenge today. Pt presented with increased tension in entire distal 1/2 of ITB with manual intervention today. Will continue to progress/advance patient within current POC as tolerated with monitoring symptoms.     Patient will continue to benefit from skilled PT services to modify and progress therapeutic interventions, address functional mobility deficits, address ROM deficits, address strength deficits and analyze and cue movement patterns to attain remaining goals.      []  See Plan of Care  []  See progress note/recertification  []  See Discharge Summary         Progress towards goals / Updated goals:      PLAN  [x]  Upgrade activities as tolerated     []  Continue plan of care  []  Update interventions per flow sheet       []  Discharge due to:_  []  Other:_      Juice Moody, PTA 12/8/2017  3:07 PM

## 2017-12-13 ENCOUNTER — HOSPITAL ENCOUNTER (OUTPATIENT)
Dept: PHYSICAL THERAPY | Age: 32
Discharge: HOME | End: 2017-12-13
Payer: COMMERCIAL

## 2017-12-13 PROCEDURE — 97140 MANUAL THERAPY 1/> REGIONS: CPT

## 2017-12-13 PROCEDURE — 97110 THERAPEUTIC EXERCISES: CPT

## 2017-12-13 NOTE — PROGRESS NOTES
PT DAILY TREATMENT NOTE     Patient Name: Jose De Jesus River  Date:2017  : 1985  [x]  Patient  Verified  Payor: AARON [place] / Plan: [address] [place] [address] / Product Type: PPO /    In time:201  Out time:317  Total Treatment Time (min): 76   Visit #: 5 of 12    Treatment Area: Left knee pain [M25.562]    SUBJECTIVE  Pain Level (0-10 scale): 2  Any medication changes, allergies to medications, adverse drug reactions, diagnosis change, or new procedure performed?: [x] No    [] Yes (see summary sheet for update)  Subjective functional status/changes:   [] No changes reported  \"My hip is hurting, however my knee feels pretty good\"    OBJECTIVE    68 min Therapeutic Exercise:  [x] See flow sheet :   Rationale: increase ROM, increase strength, improve balance and increase proprioception to improve the patients ability to improve functional abilities    8 min Manual Therapy:  Instrument assisted soft tissue mobilization applied to L distal 1/2 of ITB using GT 1&3   Rationale: increase tissue extensibility to improve the patient's ability to improve functional mobility         min Patient Education: [x] Review HEP    [] Progressed/Changed HEP based on:   [] positioning   [] body mechanics   [] transfers   [] heat/ice application        Other Objective/Functional Measures:     Pain Level (0-10 scale) post treatment: 0    ASSESSMENT/Changes in Function: Pt reports increased pain in the R hip following running 2 miles    Patient will continue to benefit from skilled PT services to modify and progress therapeutic interventions, address functional mobility deficits, address ROM deficits, address strength deficits and analyze and cue movement patterns to attain remaining goals.      []  See Plan of Care  []  See progress note/recertification  []  See Discharge Summary         Progress towards goals / Updated goals:      PLAN  [x]  Upgrade activities as tolerated     []  Continue plan of care  [] Update interventions per flow sheet       []  Discharge due to:_  []  Other:_      Agapito Paiz, PT 12/13/2017

## 2017-12-15 ENCOUNTER — HOSPITAL ENCOUNTER (OUTPATIENT)
Dept: PHYSICAL THERAPY | Age: 32
Discharge: HOME | End: 2017-12-15
Payer: COMMERCIAL

## 2017-12-15 PROCEDURE — 97140 MANUAL THERAPY 1/> REGIONS: CPT

## 2017-12-15 PROCEDURE — 97110 THERAPEUTIC EXERCISES: CPT

## 2017-12-15 NOTE — PROGRESS NOTES
PT DAILY TREATMENT NOTE     Patient Name: Ashley Horner  Date:12/15/2017  : 1985  [x]  Patient  Verified  Payor: Benita Laws / Plan: [address] [place] [address] / Product Type: PPO /    In time: 8:31  Out time: 9:42  Total Treatment Time (min): 71  Visit #: 6 of 12    Treatment Area: Left knee pain [M25.562]    SUBJECTIVE  Pain Level (0-10 scale): 3/10  L knee & R hip  Any medication changes, allergies to medications, adverse drug reactions, diagnosis change, or new procedure performed?: [x] No    [] Yes (see summary sheet for update)  Subjective functional status/changes:   [] No changes reported   \"I went for a run the other day and things have tightened up. Last night I was feeling twinge type pain, I was wearing boots and we were at [address]. The pain is changing, it used to feel like overuse pain but now it is more in the side of the knee and up. I feel a little old today. \"    OBJECTIVE    63 min Therapeutic Exercise:  [x] See flow sheet :   Rationale: increase ROM, increase strength, improve balance and increase proprioception to improve the patients ability to improve functional abilities    8 min Manual Therapy:  DTM & CFM to L distal 1/2 of ITB    Rationale: increase tissue extensibility to improve the patient's ability to improve functional mobility         min Patient Education: [x] Review HEP    [] Progressed/Changed HEP based on:   [] positioning   [] body mechanics   [] transfers   [] heat/ice application        Other Objective/Functional Measures: Progressed to star taps on SB#2 and BTB for clams. Pain Level (0-10 scale) post treatment: 2/10 R hip, 0/10 L knee    ASSESSMENT/Changes in Function: Pt reports change is type of pain and location of pain in knee. She tolerated progression to SB#2 for star taps and BTB for clams well. Pt remains challenged with GTB for sidestepping.     Patient will continue to benefit from skilled PT services to modify and progress therapeutic interventions, address functional mobility deficits, address ROM deficits, address strength deficits and analyze and cue movement patterns to attain remaining goals. [x]  See Plan of Care  []  See progress note/recertification  []  See Discharge Summary         Progress towards goals / Updated goals: · Short Term Goals: To be accomplished in  4  treatments:  1. Independent/compliant with long term HEP for flexibility/mobility. MET 12.15.17  2. Evaluate patient for video running analysis with appropriate recommendations PRN  3. Pt will demonstrate BL Jean test [facility] to demonstrate improved rectus femoris mobility at push-off     · Long Term Goals: To be accomplished in  8  treatments:  1. Improve FOTO outcome score by 10% to indicate a significant improvement in ADL function- will assess at DC  2. Pt will report 75% improvement with running and other higher impact recreational activities  3. Pt will increase gross strength > or = to 4+/5 in order to improve ability to perform functional ADL's.  4. Pt will demonstrate independence with long term HEP in order to prepare for DC.      PLAN  [x]  Upgrade activities as tolerated     [x]  Continue plan of care  []  Update interventions per flow sheet       []  Discharge due to:_  []  Other:_      MYCHAL Garcia 12/15/2017   9:30 AM

## 2017-12-20 ENCOUNTER — HOSPITAL ENCOUNTER (OUTPATIENT)
Dept: PHYSICAL THERAPY | Age: 32
Discharge: HOME | End: 2017-12-20
Payer: COMMERCIAL

## 2017-12-20 PROCEDURE — 97140 MANUAL THERAPY 1/> REGIONS: CPT

## 2017-12-20 PROCEDURE — 97110 THERAPEUTIC EXERCISES: CPT

## 2017-12-20 NOTE — PROGRESS NOTES
PT DAILY TREATMENT NOTE 8    Patient Name: Jose De Jesus River  Date:2017  : 1985  [x]  Patient  Verified  Payor: BLUE CROSS / Plan: [address] [place] [address] / Product Type: PPO /    In time:11:14  Out time:12:21  Total Treatment Time (min): 67  Total Timed Codes (min): 61  1:1 Treatment Time (min):    Visit #: 7 of 12    Treatment Area: Left knee pain [M25.562]    SUBJECTIVE  Pain Level (0-10 scale): R hip  Any medication changes, allergies to medications, adverse drug reactions, diagnosis change, or new procedure performed?: [x] No    [] Yes (see summary sheet for update)  Subjective functional status/changes:   [] No changes reported  My knee has been doing a lot better, but the front of my hip has been killing me even when I stretch out before and after I workout. OBJECTIVE    59 min Therapeutic Exercise:  [x] See flow sheet :   Rationale: increase ROM, increase strength, improve balance and increase proprioception to improve the patients ability to improve functional abilities    8 min Manual Therapy:  Deep hip flexor releases to R iliopsoas in supine   Rationale: increase tissue extensibility to improve the patient's ability to improve functional mobility                                  min Patient Education: [x] Review HEP    [] Progressed/Changed HEP based on:   [] positioning   [] body mechanics   [] transfers   [] heat/ice application        Other Objective/Functional Measures:     Pain Level (0-10 scale) post treatment: 5/10    ASSESSMENT/Changes in Function: Pt presented with chief c/o increased R anterior hip/hip flexor pain/sxs even with increased intermittent stretching before and after self workout regiments. Pt presented with increased tension in R iliopsoas with deep hip flexor releases with manual intervention today, but was able to advance to quadriped donkey kicks and sidelying abduction with moderate challenge today. Will continue to progress/advance patient within current POC as tolerated with monitoring symptoms. Patient will continue to benefit from skilled PT services to modify and progress therapeutic interventions, address functional mobility deficits, address ROM deficits, address strength deficits and analyze and cue movement patterns to attain remaining goals.      []  See Plan of Care  []  See progress note/recertification  []  See Discharge Summary         Progress towards goals / Updated goals:      PLAN  [x]  Upgrade activities as tolerated     []  Continue plan of care  []  Update interventions per flow sheet       []  Discharge due to:_  []  Other:_      Ryan John PTA 12/20/2017  11:03 AM

## 2017-12-22 ENCOUNTER — HOSPITAL ENCOUNTER (OUTPATIENT)
Dept: PHYSICAL THERAPY | Age: 32
Discharge: HOME | End: 2017-12-22
Payer: COMMERCIAL

## 2017-12-22 PROCEDURE — 97140 MANUAL THERAPY 1/> REGIONS: CPT

## 2017-12-22 PROCEDURE — 97110 THERAPEUTIC EXERCISES: CPT

## 2017-12-22 NOTE — PROGRESS NOTES
PT DAILY TREATMENT NOTE     Patient Name: Kathy Lamb  Date:2017  : 1985  [x]  Patient  Verified  Payor: Trudi Davison / Plan: [address] [place] [address] / Product Type: PPO /    In time:10:00  Out time:11:28  Total Treatment Time (min): 88  Total Timed Codes (min): 80  1:1 Treatment Time (min):    Visit #: 8 of 12    Treatment Area: Left knee pain [M25.562]    SUBJECTIVE  Pain Level (0-10 scale): 3/10  Any medication changes, allergies to medications, adverse drug reactions, diagnosis change, or new procedure performed?: [x] No    [] Yes (see summary sheet for update)  Subjective functional status/changes:   [] No changes reported  My hip felt really good after I was here last time, I think that it helped when you did those releases on my hip. I think that I figured out what is contributing to my hip tension, which is kneeling for an extended period of time with my kids. OBJECTIVE    80 min Therapeutic Exercise:  [x] See flow sheet :   Rationale: increase ROM, increase strength, improve balance and increase proprioception to improve the patients ability to improve functional abilities    8 min Manual Therapy:  Deep hip flexor releases to R iliopsoas in supine   Rationale: increase ROM, increase tissue extensibility and decrease trigger points to improve functional mobility           min Patient Education: [x] Review HEP    [] Progressed/Changed HEP based on:   [] positioning   [] body mechanics   [] transfers   [] heat/ice application        Other Objective/Functional Measures:     Pain Level (0-10 scale) post treatment: 1/10    ASSESSMENT/Changes in Function: Pt was able to advance to addition of Lateral X and full 3 way planks with LE raises with moderate challenge today. Pt reported increased benefit from deep hip flexor releases last tx, but needs continued focus on core/glut strengthening and LE biomechanical symmetry bilaterally. Will continue to progress/advance patient within current POC as tolerated with monitoring symptoms. Patient will continue to benefit from skilled PT services to modify and progress therapeutic interventions, address functional mobility deficits, address ROM deficits, address strength deficits, analyze and address soft tissue restrictions and analyze and cue movement patterns to attain remaining goals. []  See Plan of Care  []  See progress note/recertification  []  See Discharge Summary         Progress towards goals / Updated goals: Will review detailed progress/goals for physician update next treatment with continuing to progress/advance within current POC/protocol as tolerated. PLAN  [x]  Upgrade activities as tolerated     []  Continue plan of care  []  Update interventions per flow sheet       []  Discharge due to:_  []  Other:_      Rossy Steward, ABBI 12/22/2017  10:03 AM      No future appointments.

## 2017-12-27 ENCOUNTER — HOSPITAL ENCOUNTER (OUTPATIENT)
Dept: PHYSICAL THERAPY | Age: 32
Discharge: HOME | End: 2017-12-27
Payer: COMMERCIAL

## 2017-12-27 PROCEDURE — 97110 THERAPEUTIC EXERCISES: CPT

## 2017-12-27 PROCEDURE — 97140 MANUAL THERAPY 1/> REGIONS: CPT

## 2017-12-27 NOTE — PROGRESS NOTES
PT DAILY TREATMENT NOTE 8-    Patient Name: Sam Nava  Date:2017  : 1985  [x]  Patient  Verified  Payor: BLUE CROSS / Plan: [address] [place] [address] / Product Type: PPO /    In time:1:04  Out time:2:33  Total Treatment Time (min): 89  Total Timed Codes (min): 81  1:1 Treatment Time (min):    Visit #: 9 of 12    Treatment Area: Left knee pain [M25.562]    SUBJECTIVE  Pain Level (0-10 scale): 1-2/10  Any medication changes, allergies to medications, adverse drug reactions, diagnosis change, or new procedure performed?: [x] No    [] Yes (see summary sheet for update)  Subjective functional status/changes:   [] No changes reported  My hip has been feeling a lot better even with doing more at the gym since I was here last, so I think that it has been helping with you digging into my hip the past few times. OBJECTIVE      81 min Therapeutic Exercise:  [x] See flow sheet :   Rationale: increase ROM, increase strength, improve balance and increase proprioception to improve the patients ability to improve functional abilities    8 min Manual Therapy:  Deep hip flexor releases to R iliopsoas in supine   Rationale: increase ROM, increase tissue extensibility and decrease trigger points to improve functional mobility         min Patient Education: [x] Review HEP    [] Progressed/Changed HEP based on:   [] positioning   [] body mechanics   [] transfers   [] heat/ice application        Other Objective/Functional Measures:     Pain Level (0-10 scale) post treatment: 1-210    ASSESSMENT/Changes in Function:     Patient will continue to benefit from skilled PT services to modify and progress therapeutic interventions, address functional mobility deficits, address ROM deficits, address strength deficits, analyze and address soft tissue restrictions and analyze and cue movement patterns to attain remaining goals.      []  See Plan of Care  [x]  See progress note/recertification  []  See Discharge Summary         Progress towards goals / Updated goals:  See Progress note/Physician update for full detailed progress towards established goals.     PLAN  [x]  Upgrade activities as tolerated     []  Continue plan of care  []  Update interventions per flow sheet       []  Discharge due to:_  []  Other:_      Armando Abbott, ABBI 12/27/2017  12:57 PM

## 2017-12-27 NOTE — PROGRESS NOTES
[facility] AT [place] [address], [place], [address]  Phone: [phone]   [phone]  PROGRESS NOTE  Patient Name: Kathy Lamb : 1985   Treatment/Medical Diagnosis: Left knee pain [M25.562]   Referral Source: Trice Guzman MD     Date of Initial Visit: 17 Attended Visits: 9 Missed Visits: 0     SUMMARY OF TREATMENT  Therapeutic exercise for LE strengthening/flexibility, biomechanical symmetry, core strengthening/stabilization, manual intervention and HEP. CURRENT STATUS  Patient reports approximately 85% overall improvement from therapy since initial evaluation with 2/10 pain level on average in R hip, increased to 4/10 at the worst with prolonged static positioning. Pt also has been steadily increasing her gym based exercise routine as well as returning to short distance running up to 2-3 miles since last treatment with slight increase in R anterior hip discomfort afterwards. Pt however reports steady improvement in R anterior hip/hip flexor symptoms with focusing on region with manual intervention over the past few treatments. Pt would benefit from continued therapy for 6 more visits to achieve maximum medical benefit/potential with advancing within current POC. Will continue to progress/advance patient within current POC as tolerated with monitoring symptoms. LE strength measurements/MMT= (R/L) Hips= Hip Flexor=4+/5, 5/5; Abduction=5/5 bilaterally; Extension=4+/5, 5/5; Glut Medius=4+/5 bilaterally; Knees= Quads=5/5 bilaterally; Hamstrings=5/5 bilaterally   Goal/Measure of Progress Goal Met? 1. Pt will demonstrate BL Jean test [place] to demonstrate improved rectus femoris mobility at push-off   Status at last Eval: Progressing Current Status: Met yes   2. Improve FOTO outcome score by 10% to indicate a significant improvement in ADL function   Status at last Eval: 75/100 Current Status: 99/100 yes   3.    Pt will report 75% improvement with running and other higher impact recreational activities   Status at last Eval: Progressing Current Status: 80% reported improvement yes   4. Pt will increase gross strength > or = to 4+/5 in order to improve ability to perform functional ADL's. Status at last Eval: Progressing Current Status: Met yes     New Goals to be achieved in ____  treatments:  1. Pt will report ability to return to running at 5 mile distance with comfortable pace without increasing hip or knee pain >2/10 for goal of returning to premorbid running status. 2.  Pt will be able to pass 5/5 categories with return to run readiness functional testing to indicate improved core strength and LE biomechanics since initial evaluation. 3.  Pt will be given and instructed on updated HEP for continued maintenance of improved LE/core strength and return to running after discharge from therapy. RECOMMENDATIONS  Continue with current POC for 3 remaining visits left on current script plus 3 additional visits (6 total visits) with advancing as tolerated, then reassess for the need for continuation or discharge from therapy. If you have any questions/comments please contact us directly at [phone]. Thank you for allowing us to assist in the care of your patient. LPTA Signature: Pan Hirsch PTA  Date: 12/27/2017   PT Signature: Charbel Massey PT Time: 12:24 PM   NOTE TO PHYSICIAN:  PLEASE COMPLETE THE ORDERS BELOW AND FAX TO   [facility] UNIT: [phone].   If you are unable to process this request in 24 hours please contact our office: [phone].    ___ I have read the above report and request that my patient continue as recommended.   ___ I have read the above report and request that my patient continue therapy with the following changes/special instructions:_________________________________________________________   ___ I have read the above report and request that my patient be discharged from therapy.      Physician Signature:        Date:       Time:

## 2017-12-28 ENCOUNTER — TELEPHONE (OUTPATIENT)
Dept: FAMILY MEDICINE CLINIC | Age: 32
End: 2017-12-28

## 2017-12-28 NOTE — TELEPHONE ENCOUNTER
Pt's plan of care from 12/27/17 was signed by Dr. Tami Rodas and faxed back to [phone].  Confirmation received

## 2017-12-29 ENCOUNTER — HOSPITAL ENCOUNTER (OUTPATIENT)
Dept: PHYSICAL THERAPY | Age: 32
Discharge: HOME | End: 2017-12-29
Payer: COMMERCIAL

## 2017-12-29 PROCEDURE — 97110 THERAPEUTIC EXERCISES: CPT

## 2017-12-29 PROCEDURE — 97140 MANUAL THERAPY 1/> REGIONS: CPT

## 2017-12-29 NOTE — PROGRESS NOTES
PT DAILY TREATMENT NOTE     Patient Name: Tahir Vizcarra  Date:2017  : 1985  [x]  Patient  Verified  Payor: BLUE CROSS / Plan: [address] [place] [address] / Product Type: PPO /    In time: 1:11  Out time: 2:31  Total Treatment Time (min): 80  Total Timed Codes (min): 80    Visit #: 10 of 12-15    Treatment Area: Left knee pain [M25.562]    SUBJECTIVE  Pain Level (0-10 scale): 3-4/10 L ITB & knee  Any medication changes, allergies to medications, adverse drug reactions, diagnosis change, or new procedure performed?: [x] No    [] Yes (see summary sheet for update)  Subjective functional status/changes:   [] No changes reported  \"I did a mile and a half on the treadmill on an incline yesterday. It feels really good during, it doesn't hurt at all. I didn't notice any pain until late last night and early this morning. \"    OBJECTIVE    72 min Therapeutic Exercise:  [x] See flow sheet :   Rationale: increase ROM, increase strength, improve balance and increase proprioception to improve the patients ability to improve functional abilities    8 min Manual Therapy:  Deep hip flexor releases to R iliopsoas in supine   Rationale: increase ROM, increase tissue extensibility and decrease trigger points to improve functional mobility         min Patient Education: [x] Review HEP    [] Progressed/Changed HEP based on:   [] positioning   [] body mechanics   [] transfers   [] heat/ice application        Other Objective/Functional Measures: Initiated gymstick and rapid step ups today per flowsheet. Pain Level (0-10 scale) post treatment: 1/10    ASSESSMENT/Changes in Function: Pt presents with chief c/o R ITB & L knee pain after running on the treadmill yesterday. Pt tolerated progression of therex without increase in pain. She is progressing well with core strength, demonstrating good form with plank progression. Noting decreased glut strength with SLRs ABD.  Will continue to progress as able.    Patient will continue to benefit from skilled PT services to modify and progress therapeutic interventions, address functional mobility deficits, address ROM deficits, address strength deficits, analyze and address soft tissue restrictions and analyze and cue movement patterns to attain remaining goals. [x]  See Plan of Care  []  See progress note/recertification  []  See Discharge Summary         Progress towards goals / Updated goals:   1. Pt will report ability to return to running at 5 mile distance with comfortable pace without increasing hip or knee pain >2/10 for goal of returning to premorbid running status. - Progressing  2. Pt will be able to pass 5/5 categories with return to run readiness functional testing to indicate improved core strength and LE biomechanics since initial evaluation. 3. Pt will be given and instructed on updated HEP for continued maintenance of improved LE/core strength and return to running after discharge from therapy.      PLAN  [x]  Upgrade activities as tolerated     []  Continue plan of care  []  Update interventions per flow sheet       []  Discharge due to:_  []  Other:_      MYCHAL Costa 12/29/2017  1:57 PM

## 2018-01-18 ENCOUNTER — OFFICE VISIT (OUTPATIENT)
Dept: FAMILY MEDICINE CLINIC | Age: 33
End: 2018-01-18

## 2018-01-18 VITALS
HEART RATE: 76 BPM | RESPIRATION RATE: 22 BRPM | WEIGHT: 145 LBS | DIASTOLIC BLOOD PRESSURE: 68 MMHG | SYSTOLIC BLOOD PRESSURE: 116 MMHG | OXYGEN SATURATION: 98 % | BODY MASS INDEX: 20.76 KG/M2 | HEIGHT: 70 IN

## 2018-01-18 DIAGNOSIS — Z80.3 FAMILY HISTORY OF BREAST CANCER IN FIRST DEGREE RELATIVE: ICD-10-CM

## 2018-01-18 DIAGNOSIS — Z23 ENCOUNTER FOR IMMUNIZATION: ICD-10-CM

## 2018-01-18 DIAGNOSIS — N64.4 BREAST TENDERNESS IN FEMALE: Primary | ICD-10-CM

## 2018-01-18 NOTE — PROGRESS NOTES
HISTORY OF PRESENT ILLNESS  Stephani Garcia is a 28 y.o. female. Other   The history is provided by the patient and medical records. This is a new problem. Episode onset: a few weeks ago. Pertinent negatives include no chest pain and no abdominal pain. Patient Active Problem List   Diagnosis Code   (none) - all problems resolved or deleted     No current outpatient prescriptions on file. No Known Allergies      Review of Systems   Constitutional: Negative for fever and weight loss. Cardiovascular: Negative for chest pain and palpitations. Gastrointestinal: Negative for abdominal pain. Endo/Heme/Allergies:        Intermittent right lateral breast tenderness x 1 month  Denies nipple discharge     Visit Vitals    /68 (BP 1 Location: Left arm, BP Patient Position: Sitting)    Pulse 76    Resp 22    Ht 5' 10\" (1.778 m)    Wt 145 lb (65.8 kg)    SpO2 98%    BMI 20.81 kg/m2       Physical Exam   Constitutional: She is oriented to person, place, and time. She appears well-developed and well-nourished. HENT:   Head: Normocephalic. Eyes: Conjunctivae and EOM are normal.   Neck: Neck supple. Cardiovascular: Normal rate, regular rhythm and normal heart sounds. Pulmonary/Chest: Effort normal and breath sounds normal.   Breasts symmetrical, mild tenderness right upper lateral quadrant, no discrete mass noted   Musculoskeletal: She exhibits no edema. Neurological: She is alert and oriented to person, place, and time. Skin: Skin is warm and dry. Psychiatric: She has a normal mood and affect. Her behavior is normal.   Nursing note and vitals reviewed. ASSESSMENT and PLAN    ICD-10-CM ICD-9-CM    1. Breast tenderness in female N64.4 611.71 [facility] BI SCREENING INCL CAD   2. Family history of breast cancer in first degree relative Z80.3 V16.3    3.  Encounter for immunization Z23 V03.89 INFLUENZA VIRUS VAC QUAD,SPLIT,PRESV FREE SYRINGE IM   Further dispositionmammogram results if indicated. Follow up for new symptoms, worsening symptoms or failure to improve.

## 2018-01-18 NOTE — PROGRESS NOTES
Per verbal orders of Dr. Barbara Alva, injection of flu shot given by Srinivas Morse LPN. Patient instructed to remain in clinic for 20 minutes afterwards, and to report any adverse reaction to me immediately. Vaccine documentation completed. Tolerated well. Consent signed.

## 2018-01-18 NOTE — MR AVS SNAPSHOT
[address] [facility] [id number] [phone] Patient: Aiyana Mcdaniel MRN: [id number] VFD:[birth year] Visit Information Date & Time Provider Department Dept. Phone Encounter #  
 1/18/2018  3:30 PM [facility] [phone] [id number] Upcoming Health Maintenance Date Due  
 PAP AKA CERVICAL CYTOLOGY 2/17/2017 Influenza Age 5 to Adult 8/1/2017 DTaP/Tdap/Td series (2 - Td) 11/3/2025 Allergies as of 1/18/2018  Review Complete On: 1/18/2018 By: Lynda Bob MD  
 No Known Allergies Current Immunizations  Reviewed on 1/5/2016 Name Date Influenza Vaccine 10/13/2015 Influenza Vaccine (Quad) PF  Incomplete MMR 1/5/2016  8:00 AM  
 Tdap 11/3/2015 Not reviewed this visit You Were Diagnosed With   
  
 Codes Comments Breast tenderness in female    -  Primary ICD-10-CM: N64.4 ICD-9-CM: 611.71 Family history of breast cancer in first degree relative     ICD-10-CM: Z80.3 ICD-9-CM: V16.3 Encounter for immunization     ICD-10-CM: E66 ICD-9-CM: V03.89 Vitals BP Pulse Resp Height(growth percentile) Weight(growth percentile) SpO2  
 116/68 (BP 1 Location: Left arm, BP Patient Position: Sitting) 76 22 5' 10\" (1.778 m) 145 lb (65.8 kg) 98% BMI OB Status Smoking Status 20.81 kg/m2 Having regular periods Never Smoker BMI and BSA Data Body Mass Index Body Surface Area  
 20.81 kg/m 2 1.8 m 2 Your Updated Medication List  
  
Notice  As of 1/18/2018  3:55 PM  
 You have not been prescribed any medications. We Performed the Following INFLUENZA VIRUS VAC QUAD,SPLIT,PRESV FREE SYRINGE IM [id number] CPT(R)] To-Do List   
 01/18/2018 Imaging:  [facility] BI SCREENING INCL CAD Patient Instructions Further dispositionmammogram results if indicated. Follow up for new symptoms, worsening symptoms or failure to improve. Introducing [facility]! Bryant Graham introduces LOFTY patient portal. Now you can access parts of your medical record, email your doctor's office, and request medication refills online. 1. In your internet browser, go to [email or web address] 2. Click on the First Time User? Click Here link in the Sign In box. You will see the New Member Sign Up page. 3. Enter your LOFTY Access Code exactly as it appears below. You will not need to use this code after youve completed the sign-up process. If you do not sign up before the expiration date, you must request a new code. · LOFTY Access Code: [email or web address] Expires: 3/24/2018  1:00 PM 
 
4. Enter the last four digits of your Social Security Number (xxxx) and Date of Birth (mm/dd/yyyy) as indicated and click Submit. You will be taken to the next sign-up page. 5. Create a LOFTY ID. This will be your LOFTY login ID and cannot be changed, so think of one that is secure and easy to remember. 6. Create a LOFTY password. You can change your password at any time. 7. Enter your Password Reset Question and Answer. This can be used at a later time if you forget your password. 8. Enter your e-mail address. You will receive e-mail notification when new information is available in [address] 9. Click Sign Up. You can now view and download portions of your medical record. 10. Click the Download Summary menu link to download a portable copy of your medical information. If you have questions, please visit the Frequently Asked Questions section of the LOFTY website. Remember, LOFTY is NOT to be used for urgent needs. For medical emergencies, dial 911. Now available from your iPhone and Android! Please provide this summary of care documentation to your next provider. Your primary care clinician is listed as Linda Manila. If you have any questions after today's visit, please call [phone].

## 2018-01-18 NOTE — PATIENT INSTRUCTIONS
Further dispositionmammogram results if indicated. Follow up for new symptoms, worsening symptoms or failure to improve.

## 2018-01-22 ENCOUNTER — TELEPHONE (OUTPATIENT)
Dept: FAMILY MEDICINE CLINIC | Age: 33
End: 2018-01-22

## 2018-01-22 NOTE — TELEPHONE ENCOUNTER
Pt's order for mammogram was faxed to [facility] central scheduling @ [phone].  Per pt's request Confirmation received

## 2018-02-01 NOTE — PROGRESS NOTES
[facility] AT [address], [address] [place] [address] Road  Phone: [phone]   [phone]  DISCHARGE SUMMARY  Patient Name: Williams Garland : 1985   Treatment/Medical Diagnosis: Left knee pain [M25.562]   Referral Source: Elly Damon MD     Date of Initial Visit: 17 Attended Visits: 10 Missed Visits: 0     SUMMARY OF TREATMENT  Therapeutic exercise for LE strengthening/flexibility, biomechanical symmetry, core strengthening/stabilization, manual intervention and HEP. CURRENT STATUS  Patient reports approximately 85% overall improvement from therapy since initial evaluation with 2/10 pain level on average in R hip, increased to 4/10 at the worst with prolonged static positioning. Pt also has been steadily increasing her gym based exercise routine as well as returning to short distance running up to 2-3 miles since last treatment with slight increase in R anterior hip discomfort afterwards. Pt was able to progress towards high level therex as well as improve tolerance in the gym with recreational running and weight lifting. Pt did not return to therapy following her visit on 17 and will be DC at this time. LE strength measurements/MMT= (R/L) Hips= Hip Flexor=4+/5, 5/5; Abduction=5/5 bilaterally; Extension=4+/5, 5/5; Glut Medius=4+/5 bilaterally; Knees= Quads=5/5 bilaterally; Hamstrings=5/5 bilaterally           Goal/Measure of Progress Goal Met? 1. Pt will demonstrate BL Jean test [place] to demonstrate improved rectus femoris mobility at push-off   Status at last Eval: Progressing Current Status: Met yes   2.   Improve FOTO outcome score by 10% to indicate a significant improvement in ADL function   Status at last Eval: 75/100 Current Status: 99/100 yes   3.   Pt will report 75% improvement with running and other higher impact recreational activities   Status at last Eval: Progressing Current Status: 80% reported improvement yes   4. Pt will increase gross strength > or = to 4+/5 in order to improve ability to perform functional ADL's. Status at last Eval: Progressing Current Status: Met yes       RECOMMENDATIONS  Discontinue therapy. Progressing towards or have reached established goals. If you have any questions/comments please contact us directly at [phone]. Thank you for allowing us to assist in the care of your patient.     Therapist Signature: Dane Adame PT Date: 2/1/2018   Reporting Period: *NA Time: 2:40 PM

## 2018-05-31 ENCOUNTER — TELEPHONE (OUTPATIENT)
Dept: FAMILY MEDICINE CLINIC | Age: 33
End: 2018-05-31

## 2018-05-31 NOTE — TELEPHONE ENCOUNTER
Pt called and made an appt for her mammogram for 6/4/18. Due to her age her order is going to have to be changed to a diagnostic mammogram in order for her ins to cover it. Please advise.      Fax [phone] Karson Hassan

## 2018-05-31 NOTE — TELEPHONE ENCOUNTER
Pt called stating she received the incorrect fax number for Laura. The correct fax is [phone], also the referral must detail, with ultra sound when needed. Please advise.

## 2018-06-19 DIAGNOSIS — N64.4 BREAST TENDERNESS IN FEMALE: ICD-10-CM

## 2018-07-03 ENCOUNTER — TELEPHONE (OUTPATIENT)
Dept: FAMILY MEDICINE CLINIC | Age: 33
End: 2018-07-03

## 2018-07-03 DIAGNOSIS — B00.1 COLD SORE: Primary | ICD-10-CM

## 2018-07-03 RX ORDER — VALACYCLOVIR HYDROCHLORIDE 1 G/1
TABLET, FILM COATED ORAL
Qty: 4 TAB | Refills: 1 | Status: SHIPPED | OUTPATIENT
Start: 2018-07-03

## 2018-07-03 NOTE — TELEPHONE ENCOUNTER
Pt has a cold sore forming on her lip and was wondering if Dr. Angela Monroy were able to prescribe her Valtrex 1000 mg. Pt states she has not seen Dr. Angela Monroy on concern but did have a dermatologist who prescribed the medication orginally. Pt no longer sees dermatologist and would like to know if this prescription could be filled today. Please advise.

## 2018-07-17 ENCOUNTER — HOSPITAL ENCOUNTER (OUTPATIENT)
Dept: LAB | Age: 33
Discharge: HOME | End: 2018-07-17
Payer: COMMERCIAL

## 2018-07-17 ENCOUNTER — OFFICE VISIT (OUTPATIENT)
Dept: FAMILY MEDICINE CLINIC | Age: 33
End: 2018-07-17

## 2018-07-17 VITALS
BODY MASS INDEX: 21.1 KG/M2 | DIASTOLIC BLOOD PRESSURE: 78 MMHG | OXYGEN SATURATION: 98 % | WEIGHT: 147.4 LBS | TEMPERATURE: 98.3 F | HEIGHT: 70 IN | SYSTOLIC BLOOD PRESSURE: 110 MMHG | HEART RATE: 90 BPM | RESPIRATION RATE: 16 BRPM

## 2018-07-17 DIAGNOSIS — Z13.1 SCREENING FOR DIABETES MELLITUS: ICD-10-CM

## 2018-07-17 DIAGNOSIS — Z13.220 SCREENING, LIPID: ICD-10-CM

## 2018-07-17 DIAGNOSIS — Z13.228 SCREENING FOR METABOLIC DISORDER: ICD-10-CM

## 2018-07-17 DIAGNOSIS — Z13.89 SCREENING FOR BLOOD OR PROTEIN IN URINE: ICD-10-CM

## 2018-07-17 DIAGNOSIS — Z13.29 SCREENING FOR THYROID DISORDER: ICD-10-CM

## 2018-07-17 DIAGNOSIS — Z00.00 ROUTINE GENERAL MEDICAL EXAMINATION AT A HEALTH CARE FACILITY: Primary | ICD-10-CM

## 2018-07-17 DIAGNOSIS — Z13.0 SCREENING, ANEMIA, DEFICIENCY, IRON: ICD-10-CM

## 2018-07-17 DIAGNOSIS — Z00.00 ROUTINE GENERAL MEDICAL EXAMINATION AT A HEALTH CARE FACILITY: ICD-10-CM

## 2018-07-17 LAB
APPEARANCE UR: CLEAR
BACTERIA URNS QL MICRO: NEGATIVE /HPF
BILIRUB UR QL: NEGATIVE
COLOR UR: YELLOW
EPITH CASTS URNS QL MICRO: NORMAL /LPF (ref 0–5)
GLUCOSE UR STRIP.AUTO-MCNC: NEGATIVE MG/DL
HGB UR QL STRIP: NEGATIVE
KETONES UR QL STRIP.AUTO: NEGATIVE MG/DL
LEUKOCYTE ESTERASE UR QL STRIP.AUTO: ABNORMAL
NITRITE UR QL STRIP.AUTO: NEGATIVE
PH UR STRIP: 7.5 [PH] (ref 5–8)
PROT UR STRIP-MCNC: NEGATIVE MG/DL
RBC #/AREA URNS HPF: 0 /HPF (ref 0–5)
SP GR UR REFRACTOMETRY: 1.02 (ref 1–1.03)
UROBILINOGEN UR QL STRIP.AUTO: 0.2 EU/DL (ref 0.2–1)
WBC URNS QL MICRO: NORMAL /HPF (ref 0–4)

## 2018-07-17 PROCEDURE — 87624 HPV HI-RISK TYP POOLED RSLT: CPT | Performed by: FAMILY MEDICINE

## 2018-07-17 PROCEDURE — 88142 CYTOPATH C/V THIN LAYER: CPT | Performed by: FAMILY MEDICINE

## 2018-07-17 PROCEDURE — 81001 URINALYSIS AUTO W/SCOPE: CPT | Performed by: FAMILY MEDICINE

## 2018-07-17 NOTE — PATIENT INSTRUCTIONS
Further disposition pending lab results if indicated. Anticipatory guidance and recommendations provided verbally and with printed information. Return for annual physical in 1 year, sooner with any problems. Well Visit, Ages 25 to 48: Care Instructions Your Care Instructions Physical exams can help you stay healthy. Your doctor has checked your overall health and may have suggested ways to take good care of yourself. He or she also may have recommended tests. At home, you can help prevent illness with healthy eating, regular exercise, and other steps. Follow-up care is a key part of your treatment and safety. Be sure to make and go to all appointments, and call your doctor if you are having problems. It's also a good idea to know your test results and keep a list of the medicines you take. How can you care for yourself at home? · Reach and stay at a healthy weight. This will lower your risk for many problems, such as obesity, diabetes, heart disease, and high blood pressure. · Get at least 30 minutes of physical activity on most days of the week. Walking is a good choice. You also may want to do other activities, such as running, swimming, cycling, or playing tennis or team sports. Discuss any changes in your exercise program with your doctor. · Do not smoke or allow others to smoke around you. If you need help quitting, talk to your doctor about stop-smoking programs and medicines. These can increase your chances of quitting for good. · Talk to your doctor about whether you have any risk factors for sexually transmitted infections (STIs). Having one sex partner (who does not have STIs and does not have sex with anyone else) is a good way to avoid these infections. · Use birth control if you do not want to have children at this time. Talk with your doctor about the choices available and what might be best for you. · Protect your skin from too much sun.  When you're outdoors from 10 a.m. to 4 p.m., stay in the shade or cover up with clothing and a hat with a wide brim. Wear sunglasses that block UV rays. Even when it's cloudy, put broad-spectrum sunscreen (SPF 30 or higher) on any exposed skin. · See a dentist one or two times a year for checkups and to have your teeth cleaned. · Wear a seat belt in the car. · Drink alcohol in moderation, if at all. That means no more than 2 drinks a day for men and 1 drink a day for women. Follow your doctor's advice about when to have certain tests. These tests can spot problems early. For everyone · Cholesterol. Have the fat (cholesterol) in your blood tested after age 21. Your doctor will tell you how often to have this done based on your age, family history, or other things that can increase your risk for heart disease. · Blood pressure. Have your blood pressure checked during a routine doctor visit. Your doctor will tell you how often to check your blood pressure based on your age, your blood pressure results, and other factors. · Vision. Talk with your doctor about how often to have a glaucoma test. 
· Diabetes. Ask your doctor whether you should have tests for diabetes. · Colon cancer. Have a test for colon cancer at age 48. You may have one of several tests. If you are younger than 48, you may need a test earlier if you have any risk factors. Risk factors include whether you already had a precancerous polyp removed from your colon or whether your parent, brother, sister, or child has had colon cancer. For women · Breast exam and mammogram. Talk to your doctor about when you should have a clinical breast exam and a mammogram. Medical experts differ on whether and how often women under 50 should have these tests. Your doctor can help you decide what is right for you. · Pap test and pelvic exam. Begin Pap tests at age 24. A Pap test is the best way to find cervical cancer.  The test often is part of a pelvic exam. Ask how often to have this test. 
· Tests for sexually transmitted infections (STIs). Ask whether you should have tests for STIs. You may be at risk if you have sex with more than one person, especially if your partners do not wear condoms. For men · Tests for sexually transmitted infections (STIs). Ask whether you should have tests for STIs. You may be at risk if you have sex with more than one person, especially if you do not wear a condom. · Testicular cancer exam. Ask your doctor whether you should check your testicles regularly. · Prostate exam. Talk to your doctor about whether you should have a blood test (called a PSA test) for prostate cancer. Experts differ on whether and when men should have this test. Some experts suggest it if you are older than 39 and are -American or have a father or brother who got prostate cancer when he was younger than 72. When should you call for help? Watch closely for changes in your health, and be sure to contact your doctor if you have any problems or symptoms that concern you. Where can you learn more? Go to http://hyacinth-elena.info/. Enter P072 in the search box to learn more about \"Well Visit, Ages 25 to 48: Care Instructions. \" Current as of: May 16, 2017 Content Version: 11.7 © 4472-4289 [employer]. Care instructions adapted under license by [employer] (which disclaims liability or warranty for this information). If you have questions about a medical condition or this instruction, always ask your healthcare professional. [place] [address] any warranty or liability for your use of this information.

## 2018-07-17 NOTE — PROGRESS NOTES
HISTORY OF PRESENT ILLNESS  Kurt Mariano is a 35 y.o. female. Physical   The history is provided by the patient and medical records. Pertinent negatives include no chest pain, no abdominal pain, no headaches and no shortness of breath. History reviewed. No pertinent past medical history. History reviewed. No pertinent surgical history. Family History   Problem Relation Age of Onset    Diabetes Brother      type 1    Cancer Neg Hx     Heart Disease Neg Hx     Hypertension Neg Hx      History   Smoking Status    Never Smoker   Smokeless Tobacco    Never Used     History   Alcohol Use    Yes     Comment: very rare      Health Maintenance Review:  Tetanus immunization - 11/3/2015  Influenza immunization - N/A  Pap smear - 2/2016  Mammogram - 6/4/2018, normal    Review of Systems   Constitutional: Negative for chills, fever and weight loss. HENT: Negative for hearing loss. Eyes: Negative for blurred vision and double vision. Wears corrective lenses   Respiratory: Negative for cough, shortness of breath and wheezing. Cardiovascular: Negative for chest pain, palpitations and leg swelling. Gastrointestinal: Negative for abdominal pain, blood in stool, constipation, diarrhea, heartburn, melena, nausea and vomiting. Genitourinary: Negative for dysuria and urgency. Musculoskeletal: Negative for joint pain and myalgias. Skin: Negative for itching and rash. Neurological: Negative for dizziness, tingling, sensory change, focal weakness and headaches. Endo/Heme/Allergies: Positive for environmental allergies. Psychiatric/Behavioral: Negative for depression. The patient is not nervous/anxious and does not have insomnia.       Visit Vitals    /78 (BP 1 Location: Left arm, BP Patient Position: Sitting)    Pulse 90    Temp 98.3 °F (36.8 °C) (Oral)    Resp 16    Ht 5' 10\" (1.778 m)    Wt 147 lb 6.4 oz (66.9 kg)    LMP 07/08/2018 (Exact Date)    SpO2 98%    BMI 21.15 kg/m2       Physical Exam   Constitutional: She is oriented to person, place, and time. She appears well-developed and well-nourished. HENT:   Head: Normocephalic. Right Ear: Tympanic membrane and ear canal normal.   Left Ear: Tympanic membrane and ear canal normal.   Mouth/Throat: Oropharynx is clear and moist.   Eyes: Conjunctivae and EOM are normal. Pupils are equal, round, and reactive to light. Neck: Neck supple. Cardiovascular: Normal rate, regular rhythm, normal heart sounds and intact distal pulses. Pulmonary/Chest: Effort normal and breath sounds normal.   Breasts symmetrical without masses or tenderness   Abdominal: Soft. Bowel sounds are normal. She exhibits no mass. There is no tenderness. Genitourinary: Vagina normal and uterus normal. No vaginal discharge found. Genitourinary Comments: Normal external genitalia  Cervix parous, non tender   Musculoskeletal: She exhibits no edema. Neurological: She is alert and oriented to person, place, and time. She has normal reflexes. Skin: Skin is warm and dry. Psychiatric: She has a normal mood and affect. Her behavior is normal.   Nursing note and vitals reviewed. ASSESSMENT and PLAN    ICD-10-CM ICD-9-CM    1. Routine general medical examination at a health care facility Z00.00 V70.0 PAP + HPV DNA (HIGH RISK)      CBC WITH AUTOMATED DIFF      HEMOGLOBIN A1C WITH EAG      LIPID PANEL      METABOLIC PANEL, COMPREHENSIVE      URINALYSIS W/ RFLX MICROSCOPIC      TSH 3RD GENERATION   2. Screening, anemia, deficiency, iron Z13.0 V78.0 CBC WITH AUTOMATED DIFF   3. Screening for diabetes mellitus Z13.1 V77.1 HEMOGLOBIN A1C WITH EAG   4. Screening, lipid Z13.220 V77.91 LIPID PANEL   5. Screening for metabolic disorder A72.407 W92.34 METABOLIC PANEL, COMPREHENSIVE   6. Screening for blood or protein in urine Z13.89 V82.9 URINALYSIS W/ RFLX MICROSCOPIC   7.  Screening for thyroid disorder Z13.29 V77.0 TSH 3RD GENERATION   Further disposition pending lab results if indicated. Anticipatory guidance and recommendations provided verbally and with printed information. Return for annual physical in 1 year, sooner with any problems.

## 2018-07-17 NOTE — MR AVS SNAPSHOT
[address] [place] [address] [address] [facility] [address] [phone] Patient: Hector Velez MRN: [id number] JCN:[id number] Visit Information Date & Time Provider Department Dept. Phone Encounter #  
 7/17/2018  9:30 AM Jon Lujan, [address] [phone] [id number] Upcoming Health Maintenance Date Due  
 PAP AKA CERVICAL CYTOLOGY 2/17/2017 Influenza Age 5 to Adult 8/1/2018 BREAST CANCER SCRN MAMMOGRAM 6/4/2019 DTaP/Tdap/Td series (2 - Td) 11/3/2025 Allergies as of 7/17/2018  Review Complete On: 7/17/2018 By: Jon Lujan MD  
 No Known Allergies Current Immunizations  Reviewed on 1/5/2016 Name Date Influenza Vaccine 10/13/2015 Influenza Vaccine (Quad) PF 1/18/2018  4:10 PM  
 MMR 1/5/2016  8:00 AM  
 Tdap 11/3/2015 Not reviewed this visit You Were Diagnosed With   
  
 Codes Comments Routine general medical examination at a health care facility    -  Primary ICD-10-CM: Z00.00 ICD-9-CM: V70.0 Screening, anemia, deficiency, iron     ICD-10-CM: Z13.0 ICD-9-CM: V78.0 Screening for diabetes mellitus     ICD-10-CM: Z13.1 ICD-9-CM: V77.1 Screening, lipid     ICD-10-CM: M46.689 ICD-9-CM: V77.91 Screening for metabolic disorder     H-45-XJ: Z13.228 ICD-9-CM: V77.99 Screening for blood or protein in urine     ICD-10-CM: Z13.89 ICD-9-CM: V82.9 Screening for thyroid disorder     ICD-10-CM: Z13.29 ICD-9-CM: V77.0 Vitals BP Pulse Temp Resp Height(growth percentile) Weight(growth percentile) 110/78 (BP 1 Location: Left arm, BP Patient Position: Sitting) 90 98.3 °F (36.8 °C) (Oral) 16 5' 10\" (1.778 m) 147 lb 6.4 oz (66.9 kg) LMP SpO2 BMI OB Status Smoking Status 07/08/2018 (Exact Date) 98% 21.15 kg/m2 Having regular periods Never Smoker Vitals History BMI and BSA Data Body Mass Index Body Surface Area 21.15 kg/m 2 1.82 m 2 Preferred Pharmacy Pharmacy Name Phone [facility] [address] [facility] [phone] Your Updated Medication List  
  
   
This list is accurate as of 7/17/18  9:51 AM.  Always use your most recent med list.  
  
  
  
  
 valACYclovir 1 gram tablet Commonly known as:  VALTREX Take 2 tablets now and again in 12 hours To-Do List   
 07/17/2018 Lab:  CBC WITH AUTOMATED DIFF   
  
 07/17/2018 Lab:  HEMOGLOBIN A1C WITH EAG   
  
 07/17/2018 Lab:  LIPID PANEL   
  
 07/17/2018 Lab:  METABOLIC PANEL, COMPREHENSIVE   
  
 07/17/2018 Pathology:  PAP + HPV DNA (HIGH RISK)   
  
 07/17/2018 Lab:  TSH 3RD GENERATION   
  
 07/17/2018 Lab:  URINALYSIS W/ RFLX MICROSCOPIC Patient Instructions Further disposition pending lab results if indicated. Anticipatory guidance and recommendations provided verbally and with printed information. Return for annual physical in 1 year, sooner with any problems. Well Visit, Ages 25 to 48: Care Instructions Your Care Instructions Physical exams can help you stay healthy. Your doctor has checked your overall health and may have suggested ways to take good care of yourself. He or she also may have recommended tests. At home, you can help prevent illness with healthy eating, regular exercise, and other steps. Follow-up care is a key part of your treatment and safety. Be sure to make and go to all appointments, and call your doctor if you are having problems. It's also a good idea to know your test results and keep a list of the medicines you take. How can you care for yourself at home? · Reach and stay at a healthy weight. This will lower your risk for many problems, such as obesity, diabetes, heart disease, and high blood pressure. · Get at least 30 minutes of physical activity on most days of the week. Walking is a good choice. You also may want to do other activities, such as running, swimming, cycling, or playing tennis or team sports. Discuss any changes in your exercise program with your doctor. · Do not smoke or allow others to smoke around you. If you need help quitting, talk to your doctor about stop-smoking programs and medicines. These can increase your chances of quitting for good. · Talk to your doctor about whether you have any risk factors for sexually transmitted infections (STIs). Having one sex partner (who does not have STIs and does not have sex with anyone else) is a good way to avoid these infections. · Use birth control if you do not want to have children at this time. Talk with your doctor about the choices available and what might be best for you. · Protect your skin from too much sun. When you're outdoors from 10 a.m. to 4 p.m., stay in the shade or cover up with clothing and a hat with a wide brim. Wear sunglasses that block UV rays. Even when it's cloudy, put broad-spectrum sunscreen (SPF 30 or higher) on any exposed skin. · See a dentist one or two times a year for checkups and to have your teeth cleaned. · Wear a seat belt in the car. · Drink alcohol in moderation, if at all. That means no more than 2 drinks a day for men and 1 drink a day for women. Follow your doctor's advice about when to have certain tests. These tests can spot problems early. For everyone · Cholesterol. Have the fat (cholesterol) in your blood tested after age 21. Your doctor will tell you how often to have this done based on your age, family history, or other things that can increase your risk for heart disease. · Blood pressure. Have your blood pressure checked during a routine doctor visit. Your doctor will tell you how often to check your blood pressure based on your age, your blood pressure results, and other factors. · Vision.  Talk with your doctor about how often to have a glaucoma test. 
 · Diabetes. Ask your doctor whether you should have tests for diabetes. · Colon cancer. Have a test for colon cancer at age 48. You may have one of several tests. If you are younger than 48, you may need a test earlier if you have any risk factors. Risk factors include whether you already had a precancerous polyp removed from your colon or whether your parent, brother, sister, or child has had colon cancer. For women · Breast exam and mammogram. Talk to your doctor about when you should have a clinical breast exam and a mammogram. Medical experts differ on whether and how often women under 50 should have these tests. Your doctor can help you decide what is right for you. · Pap test and pelvic exam. Begin Pap tests at age 24. A Pap test is the best way to find cervical cancer. The test often is part of a pelvic exam. Ask how often to have this test. 
· Tests for sexually transmitted infections (STIs). Ask whether you should have tests for STIs. You may be at risk if you have sex with more than one person, especially if your partners do not wear condoms. For men · Tests for sexually transmitted infections (STIs). Ask whether you should have tests for STIs. You may be at risk if you have sex with more than one person, especially if you do not wear a condom. · Testicular cancer exam. Ask your doctor whether you should check your testicles regularly. · Prostate exam. Talk to your doctor about whether you should have a blood test (called a PSA test) for prostate cancer. Experts differ on whether and when men should have this test. Some experts suggest it if you are older than 39 and are -American or have a father or brother who got prostate cancer when he was younger than 72. When should you call for help? Watch closely for changes in your health, and be sure to contact your doctor if you have any problems or symptoms that concern you. Where can you learn more? Go to http://hyacinth-elena.info/. Enter P072 in the search box to learn more about \"Well Visit, Ages 25 to 48: Care Instructions. \" Current as of: May 16, 2017 Content Version: 11.7 © 6226-9231 [employer]. Care instructions adapted under license by [employer] (which disclaims liability or warranty for this information). If you have questions about a medical condition or this instruction, always ask your healthcare professional. [address] any warranty or liability for your use of this information. Introducing hospitals & HEALTH SERVICES! Maeve Hdz introduces [employer] patient portal. Now you can access parts of your medical record, email your doctor's office, and request medication refills online. 1. In your internet browser, go to [email or web address] 2. Click on the First Time User? Click Here link in the Sign In box. You will see the New Member Sign Up page. 3. Enter your [employer] Access Code exactly as it appears below. You will not need to use this code after youve completed the sign-up process. If you do not sign up before the expiration date, you must request a new code. · [employer] Access Code: [email or web address] Expires: 10/15/2018  9:51 AM 
 
4. Enter the last four digits of your Social Security Number (xxxx) and Date of Birth (mm/dd/yyyy) as indicated and click Submit. You will be taken to the next sign-up page. 5. Create a [employer] ID. This will be your [employer] login ID and cannot be changed, so think of one that is secure and easy to remember. 6. Create a [employer] password. You can change your password at any time. 7. Enter your Password Reset Question and Answer. This can be used at a later time if you forget your password. 8. Enter your e-mail address. You will receive e-mail notification when new information is available in [address] 9. Click Sign Up.  You can now view and download portions of your medical record. 10. Click the Download Summary menu link to download a portable copy of your medical information. If you have questions, please visit the Frequently Asked Questions section of the [employer] website. Remember, [employer] is NOT to be used for urgent needs. For medical emergencies, dial 911. Now available from your iPhone and Android! Please provide this summary of care documentation to your next provider. Your primary care clinician is listed as Anish Duran. If you have any questions after today's visit, please call [phone].

## 2018-07-17 NOTE — PROGRESS NOTES
Justino Corbett is a 35 y.o. female here for physical/ pap      Justino Corbett is a 35 y.o. female (: 1985) presenting to address:    Chief Complaint   Patient presents with    Physical     pt here for physical/ Pap       Vitals:    18 0923   BP: 110/78   Pulse: 90   Resp: 16   SpO2: 98%   Weight: 147 lb 6.4 oz (66.9 kg)   Height: 5' 10\" (1.778 m)   PainSc:   0 - No pain   LMP: 2018       Hearing/Vision:   No exam data present    Learning Assessment:     Learning Assessment 2017   PRIMARY LEARNER Patient   HIGHEST LEVEL OF EDUCATION - PRIMARY LEARNER  4 YEARS OF COLLEGE   BARRIERS PRIMARY LEARNER NONE   CO-LEARNER CAREGIVER No   PRIMARY LANGUAGE ENGLISH   LEARNER PREFERENCE PRIMARY DEMONSTRATION   ANSWERED BY patient   RELATIONSHIP SELF     Depression Screening:     PHQ over the last two weeks 2018   Little interest or pleasure in doing things Not at all   Feeling down, depressed or hopeless Not at all   Total Score PHQ 2 0     Fall Risk Assessment:   No flowsheet data found. Abuse Screening:   No flowsheet data found. Coordination of Care Questionaire:   1. Have you been to the ER, urgent care clinic since your last visit? Hospitalized since your last visit? NO    2. Have you seen or consulted any other health care providers outside of the [address] [place] [address] since your last visit? Include any pap smears or colon screening. NO    Advanced Directive:   1. Do you have an Advanced Directive? NO    2. Would you like information on Advanced Directives?  NO

## 2018-07-18 NOTE — PROGRESS NOTES
Please check with patient/phlebotomist - looks like urine specimen was obtained but blood was not drawn.

## 2018-07-18 NOTE — PROGRESS NOTES
Per  no labs drawn. Dr. Ashok Arreola made aware.  Called pt, l/m to advise her to come back for lab draw

## 2018-07-20 NOTE — PROGRESS NOTES
Please advise that pap smear was normal, HPV screen negative. Next pap smear due in 5 years. Remaining lab studies have not barbara drawn.

## 2024-03-18 NOTE — PROGRESS NOTES
Chad Garcia is a 28 y.o. female here for pain      Chad Garcia is a 28 y.o. female (: 1985) presenting to address:    Chief Complaint   Patient presents with    Other     pt states for a few weeks she's had off and on R armpit pain        Vitals:    18 1532   BP: 116/68   Pulse: 76   Resp: 22   SpO2: 98%   Weight: 145 lb (65.8 kg)   Height: 5' 10\" (1.778 m)   PainSc:   0 - No pain       Hearing/Vision:   No exam data present    Learning Assessment:     Learning Assessment 2017   PRIMARY LEARNER Patient   HIGHEST LEVEL OF EDUCATION - PRIMARY LEARNER  4 YEARS OF COLLEGE   BARRIERS PRIMARY LEARNER NONE   CO-LEARNER CAREGIVER No   PRIMARY LANGUAGE ENGLISH   LEARNER PREFERENCE PRIMARY DEMONSTRATION   ANSWERED BY patient   RELATIONSHIP SELF     Depression Screening:     PHQ over the last two weeks 2018   Little interest or pleasure in doing things Not at all   Feeling down, depressed or hopeless Not at all   Total Score PHQ 2 0     Fall Risk Assessment:   No flowsheet data found. Abuse Screening:   No flowsheet data found. Coordination of Care Questionaire:   1. Have you been to the ER, urgent care clinic since your last visit? Hospitalized since your last visit? NO    2. Have you seen or consulted any other health care providers outside of the [address] [place] [address] since your last visit? Include any pap smears or colon screening. NO    Advanced Directive:   1. Do you have an Advanced Directive? NO    2. Would you like information on Advanced Directives?  NO PLAN:   1.   Symptomatic therapy suggested: rest, increase fluids, apply heat to sinuses prn, use mist of vaporizer prn, OTC acetaminophen, ibuprofen, saline nasal spray as needed, and call prn if symptoms persist or worsen.  2.  Orders Placed This Encounter   Medications    amoxicillin-clavulanate (AUGMENTIN) 875-125 MG tablet     Sig: Take 1 tablet by mouth 2 times daily     Dispense:  20 tablet     Refill:  0     3.   Patient needs to follow up in if no improvement,or sooner if worsening of symptoms or other symptoms develop.